# Patient Record
Sex: MALE | Race: WHITE | NOT HISPANIC OR LATINO | URBAN - METROPOLITAN AREA
[De-identification: names, ages, dates, MRNs, and addresses within clinical notes are randomized per-mention and may not be internally consistent; named-entity substitution may affect disease eponyms.]

---

## 2021-11-17 ENCOUNTER — INPATIENT (INPATIENT)
Facility: HOSPITAL | Age: 61
LOS: 2 days | Discharge: HOME | DRG: 41 | End: 2021-11-20
Attending: STUDENT IN AN ORGANIZED HEALTH CARE EDUCATION/TRAINING PROGRAM | Admitting: PSYCHIATRY & NEUROLOGY
Payer: COMMERCIAL

## 2021-11-17 VITALS
RESPIRATION RATE: 17 BRPM | OXYGEN SATURATION: 99 % | DIASTOLIC BLOOD PRESSURE: 69 MMHG | HEART RATE: 76 BPM | SYSTOLIC BLOOD PRESSURE: 104 MMHG | TEMPERATURE: 98 F

## 2021-11-17 DIAGNOSIS — G45.9 TRANSIENT CEREBRAL ISCHEMIC ATTACK, UNSPECIFIED: ICD-10-CM

## 2021-11-17 LAB
GLUCOSE BLDC GLUCOMTR-MCNC: 100 MG/DL — HIGH (ref 70–99)
GLUCOSE BLDC GLUCOMTR-MCNC: 67 MG/DL — LOW (ref 70–99)
GLUCOSE BLDC GLUCOMTR-MCNC: 83 MG/DL — SIGNIFICANT CHANGE UP (ref 70–99)
GLUCOSE BLDC GLUCOMTR-MCNC: 88 MG/DL — SIGNIFICANT CHANGE UP (ref 70–99)
HIV 1 & 2 AB SERPL IA.RAPID: SIGNIFICANT CHANGE UP
TSH SERPL-MCNC: 1.07 UIU/ML — SIGNIFICANT CHANGE UP (ref 0.27–4.2)

## 2021-11-17 PROCEDURE — 99291 CRITICAL CARE FIRST HOUR: CPT

## 2021-11-17 PROCEDURE — 99285 EMERGENCY DEPT VISIT HI MDM: CPT

## 2021-11-17 PROCEDURE — 93306 TTE W/DOPPLER COMPLETE: CPT | Mod: 26

## 2021-11-17 PROCEDURE — 99223 1ST HOSP IP/OBS HIGH 75: CPT

## 2021-11-17 RX ORDER — ASPIRIN/CALCIUM CARB/MAGNESIUM 324 MG
325 TABLET ORAL ONCE
Refills: 0 | Status: COMPLETED | OUTPATIENT
Start: 2021-11-17 | End: 2021-11-17

## 2021-11-17 RX ORDER — INFLUENZA VIRUS VACCINE 15; 15; 15; 15 UG/.5ML; UG/.5ML; UG/.5ML; UG/.5ML
0.5 SUSPENSION INTRAMUSCULAR ONCE
Refills: 0 | Status: DISCONTINUED | OUTPATIENT
Start: 2021-11-17 | End: 2021-11-20

## 2021-11-17 RX ORDER — SODIUM CHLORIDE 9 MG/ML
1000 INJECTION, SOLUTION INTRAVENOUS
Refills: 0 | Status: DISCONTINUED | OUTPATIENT
Start: 2021-11-17 | End: 2021-11-20

## 2021-11-17 RX ORDER — DEXTROSE 50 % IN WATER 50 %
12.5 SYRINGE (ML) INTRAVENOUS ONCE
Refills: 0 | Status: DISCONTINUED | OUTPATIENT
Start: 2021-11-17 | End: 2021-11-20

## 2021-11-17 RX ORDER — DEXTROSE 50 % IN WATER 50 %
25 SYRINGE (ML) INTRAVENOUS ONCE
Refills: 0 | Status: DISCONTINUED | OUTPATIENT
Start: 2021-11-17 | End: 2021-11-20

## 2021-11-17 RX ORDER — DEXTROSE 50 % IN WATER 50 %
15 SYRINGE (ML) INTRAVENOUS ONCE
Refills: 0 | Status: DISCONTINUED | OUTPATIENT
Start: 2021-11-17 | End: 2021-11-20

## 2021-11-17 RX ORDER — SODIUM CHLORIDE 9 MG/ML
1000 INJECTION INTRAMUSCULAR; INTRAVENOUS; SUBCUTANEOUS
Refills: 0 | Status: DISCONTINUED | OUTPATIENT
Start: 2021-11-17 | End: 2021-11-18

## 2021-11-17 RX ORDER — GLUCAGON INJECTION, SOLUTION 0.5 MG/.1ML
1 INJECTION, SOLUTION SUBCUTANEOUS ONCE
Refills: 0 | Status: COMPLETED | OUTPATIENT
Start: 2021-11-17 | End: 2022-10-16

## 2021-11-17 RX ORDER — DEXTROSE 50 % IN WATER 50 %
25 SYRINGE (ML) INTRAVENOUS ONCE
Refills: 0 | Status: COMPLETED | OUTPATIENT
Start: 2021-11-17 | End: 2021-11-17

## 2021-11-17 RX ADMIN — Medication 325 MILLIGRAM(S): at 12:38

## 2021-11-17 RX ADMIN — SODIUM CHLORIDE 75 MILLILITER(S): 9 INJECTION INTRAMUSCULAR; INTRAVENOUS; SUBCUTANEOUS at 15:54

## 2021-11-17 RX ADMIN — SODIUM CHLORIDE 1000 MILLILITER(S): 9 INJECTION INTRAMUSCULAR; INTRAVENOUS; SUBCUTANEOUS at 20:20

## 2021-11-17 RX ADMIN — SODIUM CHLORIDE 75 MILLILITER(S): 9 INJECTION INTRAMUSCULAR; INTRAVENOUS; SUBCUTANEOUS at 12:38

## 2021-11-17 RX ADMIN — Medication 25 GRAM(S): at 13:53

## 2021-11-17 NOTE — ED ADULT NURSE NOTE - OBJECTIVE STATEMENT
assumed pt care at 1220.  Pt reports waking up this morning with aphasic, visual changes, left side facial droop and right arm weakness.  Symptoms started to resolve when EMS arrived.  He was transferred from [facility] to [facility] today after MRI and bloodwork done at [place].  He is alert and oriented X 4 with no deficits and no pain.  Family at bedside.  Side rails up. assumed pt care at 1220.  Pt reports waking up this morning with aphasic, visual changes, left side facial droop and right arm weakness.  Symptoms started to resolve when EMS arrived.  All symptoms resolved after MRI.  He was transferred from [facility] to [facility] today after MRI and bloodwork done at [place].  He is alert and oriented X 4 with no deficits and no pain. Pt has baseline right sided weakness due to poly myositis.  Family at bedside.  Side rails up.

## 2021-11-17 NOTE — ED PROVIDER NOTE - OBJECTIVE STATEMENT
60 yo male pmh rheumatoid arthritis, Raynauds disease comes to ed transferred from [facility]  for evaluation of Stroke/TIa; pt last known normal  930 pm ; pt woke up at 5am with double vision , generalized weakness and difficulty speaking; While at [place] , pt obtained ct of head, CTA head and neck and MR of brain during which time pt's symptoms resolved; pt presently without complaints;   code stroke called in ed; Neurology at bedside;

## 2021-11-17 NOTE — ED ADULT NURSE REASSESSMENT NOTE - NS ED NURSE REASSESS COMMENT FT1
Pt given 8oz of apple juice for blood glucose 58 upon arrival to ED.  Neuro ICU team at bedside and is aware.

## 2021-11-17 NOTE — PROVIDER CONTACT NOTE (OTHER) - SITUATION
finger stick 58 upon arrival, given 8oz apple juice at 1240.  blurred vision left eye approx 1330, finger stick 72.  given 8oz orange juice, package of jh doons, package of berenice crackers.  BG 76

## 2021-11-17 NOTE — H&P ADULT - NSHPPHYSICALEXAM_GEN_ALL_CORE
GEN: NAD, well groomed  HEAD: Atraumatic, normocephalic  MENTAL STATUS: Awake, alert, oriented to self, hospital, full date. Appropriately conversant without aphasia. Following commands  CRANIAL NERVES: PERRL. EOMI. Facial sensation intact. Face symmetric, tongue midline. Hearing intact. Speech clear  MOTOR: b/l upper extremities 5/5 except b/l triceps 4-/5; b/l LE 4/5  SENSATION: Grossly intact to light touch throughout  CHEST: Nonlabored breathing, no respiratory distress  ABD: Soft, nontender, nondistended  EXTREMITIES: Nonedematous, nontender to palpation

## 2021-11-17 NOTE — H&P ADULT - ASSESSMENT
NOTE INCOMPLETE 61M PMH rheumatoid arthritis, polymyositis with baseline b/l upper extremity and RLE weakness, Raynaud's syndrome,  presented to [facility] after acute onset altered mental status 05:30 AM nonverbal, facial droop, right hemiparesis, ataxia. 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 Westfall ([facility])  - D/w Dr. Ibarra (neurology)    NEURO:  - Q1 neuro checks in [facility]  - Start vEEG to rule out seizure  - Stroke core measures: [place] A1C 5.4, TSH 1.07, LDL 85  - Activity: [x] mobilize as tolerated [] Bedrest [x] PT [x] OT [] PMNR    PULM:  - SpO2 goal > 92%    CV:  - SBP goal 100-160  - TTE     RENAL:  - Fluids: NS @ 75 until tolerating diet  - Monitor I/Os    GI:  - Diet: Advance as tolerated once passes dysphagia  - GI prophylaxis [x] not indicated [] PPI [] other:  - Bowel regimen [] colace [x] senna [] other:    ENDO:   - Goal euglycemia (-180)    HEME/ONC:  - VTE prophylaxis: [x] SCDs [] chemoprophylaxis [] hold chemoprophylaxis due to: [] high risk of DVT/PE on admission due to:    ID:  - Monitor for fever/leukoctyosis    SOCIAL/FAMILY:  [] awaiting [x] updated at bedside [] family meeting    CODE STATUS:  [x] Full Code [] DNR [] DNI [] Palliative/Comfort Care    DISPOSITION:  [x] ICU [] Stroke Unit [] Floor [] EMU [] RCU [] PCU    [x] Patient is at high risk of neurologic deterioration/death due to: stroke, hemorrhagic conversion, seizure    Time spent: 30 critical care minutes

## 2021-11-17 NOTE — H&P ADULT - ATTENDING COMMENTS
Agree with above assessment and plan. Pt seen and examined.    61M PMH rheumatoid arthritis, polymyositis with baseline b/l upper extremity and RLE weakness, Raynaud's syndrome, only on holistic medication, presented to [facility] after acute onset altered mental status earlier this AM. Pt was seen via telestroke. Upon arrival to [facility] ED all symptoms had resolved.    Neurointact  NIHSS 0    admit for stroke/TIA eval  EEG for possible seizure  stroke core measures

## 2021-11-17 NOTE — CONSULT NOTE ADULT - SUBJECTIVE AND OBJECTIVE BOX
NIH SS:   Date:   Time:   1a) Level of consciousness (0-3):   1b) Questions (0-2):   1c) Commands (0-2):   2  ) Gaze (0-2):   3  ) Visual field (0-3):   4  ) Facial palsy (0-3):   Motor  5a) Left arm (0-4):   5b) Right arm (0-4):   6a) Left leg (0-4):   6b) Right leg (0-4):   7  ) Ataxia (0-2):   Sensory  8  ) Sensory (0-2):   Speech  9  ) Language (0-3):   10) Dysarthria (0-2):   Extinction  11) Extinction/inattention (0-2):     Total score:     Prestroke Modified [place]:     (0: No symptoms and no disability.  1: No significant disability despite symptoms; able to carry out all usual duties and activities.  2: Slight disability; unable to carry out all previous activity but able to look after own affairs without assistance.  3: Moderate disability; requiring some help but able to walk without assistance.   4: Moderately severe disability; unable to walk without assistance and unable to attend to own bodily needs without assistance.  5: Severe disability; bedridden, incontinent and requiring constant nursing care and attention.   6: Dead. )   NIH SS:   Date:   Time:   1a) Level of consciousness (0-3):   1b) Questions (0-2):   1c) Commands (0-2):   2  ) Gaze (0-2):   3  ) Visual field (0-3):   4  ) Facial palsy (0-3):   Motor  5a) Left arm (0-4):   5b) Right arm (0-4):   6a) Left leg (0-4):   6b) Right leg (0-4):   7  ) Ataxia (0-2):   Sensory  8  ) Sensory (0-2):   Speech  9  ) Language (0-3):   10) Dysarthria (0-2):   Extinction  11) Extinction/inattention (0-2):     Total score:     Prestroke Modified [place]:              Neurology consult    LESLI SCHROEDER 61y Male     Patient is a 61y old  Male who presents with a chief complaint of Acute onset hemiparesis and difficulty speaking (17 Nov 2021 12:53)      HPI:  61M PMH rheumatoid arthritis, polymyositis with baseline b/l upper extremity and RLE weakness, Raynaud's syndrome, only on holistic medication, presented to [facility] after acute onset altered mental status earlier this AM, last known well 21:30 last night when he went to bed. Per daughter and patient, he awoke ~ 05:30 AM and was able to walk down the stairs, but while he was sitting on the couch he was noted to be nonverbal, have a left facial droop, and have difficulty moving his right side. On arrival to [facility], NIH 20, CT/CTA/CTP without acute findings. MRI/MRA brain also obtained without acute findings. 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 No nausea, vomiting  No diarrhea or constipation.   Genitourinary: No dysuria, frequency, hematuria or incontinence  Neurological: No headaches, lightheadedness, vertigo, numbness or tremors  Psychiatric: No depression, anxiety, mood swings or difficulty sleeping  Musculoskeletal: No joint pain or swelling; No muscle, back or extremity pain  Skin: No itching, burning, rashes or lesions   Lymph Nodes: No enlarged glands  Endocrine: No heat or cold intolerance; No hair loss, No h/o diabetes or thyroid dysfunction  Allergy and Immunologic: No hives or eczema      PMH: Rheumatoid arthritis    Raynaud disease    History of myositis         PSH: No significant past surgical history      FAMILY HISTORY:      SOCIAL HISTORY:  No history of tobacco or alcohol use     Allergies    No Known Allergies    Intolerances            Vital Signs Last 24 Hrs  T(C): 37 (17 Nov 2021 19:31), Max: 37 (17 Nov 2021 16:00)  T(F): 98.6 (17 Nov 2021 19:31), Max: 98.6 (17 Nov 2021 16:00)  HR: 66 (17 Nov 2021 21:00) (66 - 78)  BP: 103/67 (17 Nov 2021 21:00) (90/59 - 109/59)  BP(mean): 78 (17 Nov 2021 21:00) (67 - 82)  RR: 18 (17 Nov 2021 21:00) (11 - 23)  SpO2: 98% (17 Nov 2021 21:00) (97% - 100%)  MEDICATIONS    dextrose 40% Gel 15 Gram(s) Oral once  dextrose 5%. 1000 milliLiter(s) IV Continuous <Continuous>  dextrose 5%. 1000 milliLiter(s) IV Continuous <Continuous>  dextrose 50% Injectable 25 Gram(s) IV Push once  dextrose 50% Injectable 12.5 Gram(s) IV Push once  dextrose 50% Injectable 25 Gram(s) IV Push once  glucagon  Injectable 1 milliGRAM(s) IntraMuscular once  influenza   Vaccine 0.5 milliLiter(s) IntraMuscular once  sodium chloride 0.9%. 1000 milliLiter(s) IV Continuous <Continuous>        LABS:          On Neurological Examination:    Mental Status - Patient is alert, awake, oriented X3. fluent, names and repeats correctly  There is no dysarthria, no aphasia, follows commands well and able to answer questions appropriately. Mood and affect  normal    Cranial Nerves - PERRL, EOMI,  Visual fields are full to finger counting, no gross facial asymmetry, tongue/uvula midline    Motor Exam -   Both UE 4/5 proximally and 5/5 distally  Both LE -5/5 No drift     nml bulk/tone    Sensory    Intact to light touch and pinprick bilaterally    Coord: FTN intact bilaterally     Gait -  Not assessed.                      [facility] SS:   Date: 11-  Time: 1130  1a) Level of consciousness (0-3):   1b) Questions (0-2):   1c) Commands (0-2):   2  ) Gaze (0-2):   3  ) Visual field (0-3):   4  ) Facial palsy (0-3):   Motor  5a) Left arm (0-4):   5b) Right arm (0-4):   6a) Left leg (0-4):   6b) Right leg (0-4):   7  ) Ataxia (0-2):   Sensory  8  ) Sensory (0-2):   Speech  9  ) Language (0-3):   10) Dysarthria (0-2):   Extinction  11) Extinction/inattention (0-2):     Total score: = 0    Prestroke Modified [place]:         GENERAL Exam:     Nontoxic , No Acute Distress   	  HEENT:  normocephalic, atraumatic  		  LUNGS:	Clear bilaterally  No Wheeze  Decreased bilaterally  	  HEART:	 Normal S1S2   No murmur RRR        	  GI/ ABDOMEN:  Soft  Non tender    EXTREMITIES:   No Edema  No Clubbing  No Cyanosis No Edema    MUSCULOSKELETAL: Normal Range of Motion  	   SKIN:      Normal   No Ecchymosis               RADIOLOGY  CTH     No acute intracranial bleeding, mass effect, or shift.    CT PERFUSION: No regional perfusion abnormality.  l  CTA BRAIN: Patent intracranial circulation. No flow-limiting stenosis or  occlusion.    CTA NECK: Patent cervical vasculature. No flow limiting stenosis or  occlusion.    Incidentally noted is soft tissue asymmetry along the left tonsillar region  as described above, correlate clinically with visual inspection.      MRI:    MRI BRAIN:  No hydrocephalus, mass effect, acute intracranial hemorrhage, vasogenic  edema, or acute territorial infarct.    Signal abnormality in the mesial left thalamus on diffusion-weighted images  is felt to represent artifact.    MRA BRAIN:  No vascular aneurysm or flow-limiting stenosis.    MRA NECK:  flow-limiting stenosis or vascular aneurysm.  No evidence for arterial dissection.        TTE    Summary:   1. Left ventricular ejection fraction, by visual estimation, is 70 to 75%.   2. Normal global left ventricular systolic function.   3. Diastolic function is normal.   4. Normal right ventricular size and function, estimated PASP 33 mmHg.   5. Trace mitral valve regurgitation.   6. Mild tricuspid regurgitation.   7. There is no evidence of pericardial effusion.

## 2021-11-17 NOTE — PROVIDER CONTACT NOTE (CHANGE IN STATUS NOTIFICATION) - ACTION/TREATMENT ORDERED:
As per PA, will change BP parameter goals
as per PA, give 1 L NS over 1 hour
As per PA, let bolus finish and then measure BP

## 2021-11-17 NOTE — H&P ADULT - NSHPLABSRESULTS_GEN_ALL_CORE
[facility]    WBC 3.38  H/H 12.8/39.6      Na 141  K 4.4  Cl 107  CO2 26  BUN 13  Cr 0.80  Glucose 88    A1C 5.4  TSH 1.07  LDL 85

## 2021-11-17 NOTE — ED ADULT NURSE NOTE - CHIEF COMPLAINT QUOTE
transfer from [place] for expressive aphasia, weakness and double vision this morning, all resolved at this time.  stroke activated upon arrival.

## 2021-11-17 NOTE — ED ADULT TRIAGE NOTE - CHIEF COMPLAINT QUOTE
transfer from [place] for expressive aphasia, weakness and double vision this morning, all resolved at this time.  stroke activated upon arrival.

## 2021-11-17 NOTE — PROVIDER CONTACT NOTE (CHANGE IN STATUS NOTIFICATION) - SITUATION
patient /67, bolus still running
Discussed with PA BP goals for patient. For now, goal is -220 although patients /70
Patient's /65- his baseline is 100s-110s.

## 2021-11-17 NOTE — ED PROVIDER NOTE - PHYSICAL EXAMINATION
Alert, lucid, and in no apparent distress. Pt is normocephalic, atraumatic.  Pupils are equal, round, lips pink, moist mucous membranes, tongue midline. Neck supple.   Lungs clear to auscultation. Heart regular rate and rhythm, normal S1, S2, no murmurs, gallops, rubs.  Abdomen is soft, nontender, no pulsatile mass, no masses,    Non-focal sensory, 5 out of 5 motor strength, no dysmetria, fluent, goal directed speech. CN2 to 12 intact. Skin without rash, purpura, eccyhmosis  No submandibular adenopathy. Normal mentation, does not appear agitated

## 2021-11-17 NOTE — H&P ADULT - HISTORY OF PRESENT ILLNESS
61M PMH rheumatoid arthritis, polymyositis with baseline b/l upper extremity and RLE weakness, Raynaud's syndrome, only on holistic medication, presented to [facility] after acute onset altered mental status earlier this AM, last known well 21:30 last night when he went to bed. Per daughter and patient, he awoke ~ 05:30 AM and was able to walk down the stairs, but while he was sitting on the couch he was noted to be nonverbal, have a left facial droop, and have difficulty moving his right side. On arrival to [facility], NIH 20, CT/CTA/CTP without     NOTE INCOMPLETE 61M PMH rheumatoid arthritis, polymyositis with baseline b/l upper extremity and RLE weakness, Raynaud's syndrome, only on holistic medication, presented to [facility] after acute onset altered mental status earlier this AM, last known well 21:30 last night when he went to bed. Per daughter and patient, he awoke ~ 05:30 AM and was able to walk down the stairs, but while he was sitting on the couch he was noted to be nonverbal, have a left facial droop, and have difficulty moving his right side. On arrival to [facility], NIH 20, CT/CTA/CTP without acute findings. MRI/MRA brain also obtained without acute findings. 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

## 2021-11-18 ENCOUNTER — TRANSCRIPTION ENCOUNTER (OUTPATIENT)
Age: 61
End: 2021-11-18

## 2021-11-18 DIAGNOSIS — M06.9 RHEUMATOID ARTHRITIS, UNSPECIFIED: ICD-10-CM

## 2021-11-18 DIAGNOSIS — Z87.39 PERSONAL HISTORY OF OTHER DISEASES OF THE MUSCULOSKELETAL SYSTEM AND CONNECTIVE TISSUE: ICD-10-CM

## 2021-11-18 DIAGNOSIS — I73.00 RAYNAUD'S SYNDROME WITHOUT GANGRENE: ICD-10-CM

## 2021-11-18 DIAGNOSIS — G45.9 TRANSIENT CEREBRAL ISCHEMIC ATTACK, UNSPECIFIED: ICD-10-CM

## 2021-11-18 DIAGNOSIS — Z02.9 ENCOUNTER FOR ADMINISTRATIVE EXAMINATIONS, UNSPECIFIED: ICD-10-CM

## 2021-11-18 LAB
A1C WITH ESTIMATED AVERAGE GLUCOSE RESULT: 5 % — SIGNIFICANT CHANGE UP (ref 4–5.6)
ANION GAP SERPL CALC-SCNC: 10 MMOL/L — SIGNIFICANT CHANGE UP (ref 5–17)
BUN SERPL-MCNC: 8.7 MG/DL — SIGNIFICANT CHANGE UP (ref 8–20)
CALCIUM SERPL-MCNC: 8.2 MG/DL — LOW (ref 8.6–10.2)
CHLORIDE SERPL-SCNC: 111 MMOL/L — HIGH (ref 98–107)
CHOLEST SERPL-MCNC: 122 MG/DL — SIGNIFICANT CHANGE UP
CO2 SERPL-SCNC: 21 MMOL/L — LOW (ref 22–29)
COVID-19 NUCLEOCAPSID GAM AB INTERP: NEGATIVE — SIGNIFICANT CHANGE UP
COVID-19 NUCLEOCAPSID TOTAL GAM ANTIBODY RESULT: 0.17 INDEX — SIGNIFICANT CHANGE UP
COVID-19 SPIKE DOMAIN AB INTERP: NEGATIVE — SIGNIFICANT CHANGE UP
CREAT SERPL-MCNC: 0.53 MG/DL — SIGNIFICANT CHANGE UP (ref 0.5–1.3)
ESTIMATED AVERAGE GLUCOSE: 97 MG/DL — SIGNIFICANT CHANGE UP (ref 68–114)
GLUCOSE BLDC GLUCOMTR-MCNC: 118 MG/DL — HIGH (ref 70–99)
GLUCOSE BLDC GLUCOMTR-MCNC: 86 MG/DL — SIGNIFICANT CHANGE UP (ref 70–99)
GLUCOSE BLDC GLUCOMTR-MCNC: 88 MG/DL — SIGNIFICANT CHANGE UP (ref 70–99)
GLUCOSE BLDC GLUCOMTR-MCNC: 89 MG/DL — SIGNIFICANT CHANGE UP (ref 70–99)
GLUCOSE SERPL-MCNC: 88 MG/DL — SIGNIFICANT CHANGE UP (ref 70–99)
HCT VFR BLD CALC: 36 % — LOW (ref 39–50)
HCV AB S/CO SERPL IA: 0.13 S/CO — SIGNIFICANT CHANGE UP (ref 0–0.99)
HCV AB SERPL-IMP: SIGNIFICANT CHANGE UP
HDLC SERPL-MCNC: 39 MG/DL — LOW
HGB BLD-MCNC: 11.5 G/DL — LOW (ref 13–17)
LIPID PNL WITH DIRECT LDL SERPL: 70 MG/DL — SIGNIFICANT CHANGE UP
MAGNESIUM SERPL-MCNC: 1.9 MG/DL — SIGNIFICANT CHANGE UP (ref 1.6–2.6)
MCHC RBC-ENTMCNC: 29.6 PG — SIGNIFICANT CHANGE UP (ref 27–34)
MCHC RBC-ENTMCNC: 31.9 GM/DL — LOW (ref 32–36)
MCV RBC AUTO: 92.5 FL — SIGNIFICANT CHANGE UP (ref 80–100)
NON HDL CHOLESTEROL: 83 MG/DL — SIGNIFICANT CHANGE UP
PHOSPHATE SERPL-MCNC: 2.8 MG/DL — SIGNIFICANT CHANGE UP (ref 2.4–4.7)
PLATELET # BLD AUTO: 170 K/UL — SIGNIFICANT CHANGE UP (ref 150–400)
POTASSIUM SERPL-MCNC: 4.2 MMOL/L — SIGNIFICANT CHANGE UP (ref 3.5–5.3)
POTASSIUM SERPL-SCNC: 4.2 MMOL/L — SIGNIFICANT CHANGE UP (ref 3.5–5.3)
RBC # BLD: 3.89 M/UL — LOW (ref 4.2–5.8)
RBC # FLD: 14.7 % — HIGH (ref 10.3–14.5)
SARS-COV-2 IGG+IGM SERPL QL IA: 0.17 INDEX — SIGNIFICANT CHANGE UP
SARS-COV-2 IGG+IGM SERPL QL IA: NEGATIVE — SIGNIFICANT CHANGE UP
SARS-COV-2 IGG+IGM SERPL QL IA: NEGATIVE — SIGNIFICANT CHANGE UP
SODIUM SERPL-SCNC: 142 MMOL/L — SIGNIFICANT CHANGE UP (ref 135–145)
TRIGL SERPL-MCNC: 65 MG/DL — SIGNIFICANT CHANGE UP
WBC # BLD: 3.26 K/UL — LOW (ref 3.8–10.5)
WBC # FLD AUTO: 3.26 K/UL — LOW (ref 3.8–10.5)

## 2021-11-18 PROCEDURE — 99233 SBSQ HOSP IP/OBS HIGH 50: CPT

## 2021-11-18 PROCEDURE — 99232 SBSQ HOSP IP/OBS MODERATE 35: CPT

## 2021-11-18 PROCEDURE — 95718 EEG PHYS/QHP 2-12 HR W/VEEG: CPT

## 2021-11-18 RX ORDER — MAGNESIUM SULFATE 500 MG/ML
1 VIAL (ML) INJECTION ONCE
Refills: 0 | Status: COMPLETED | OUTPATIENT
Start: 2021-11-18 | End: 2021-11-18

## 2021-11-18 RX ORDER — ASPIRIN/CALCIUM CARB/MAGNESIUM 324 MG
81 TABLET ORAL DAILY
Refills: 0 | Status: DISCONTINUED | OUTPATIENT
Start: 2021-11-18 | End: 2021-11-20

## 2021-11-18 RX ORDER — SODIUM,POTASSIUM PHOSPHATES 278-250MG
1 POWDER IN PACKET (EA) ORAL ONCE
Refills: 0 | Status: COMPLETED | OUTPATIENT
Start: 2021-11-18 | End: 2021-11-18

## 2021-11-18 RX ORDER — SODIUM CHLORIDE 9 MG/ML
1000 INJECTION INTRAMUSCULAR; INTRAVENOUS; SUBCUTANEOUS ONCE
Refills: 0 | Status: COMPLETED | OUTPATIENT
Start: 2021-11-18 | End: 2021-11-17

## 2021-11-18 RX ORDER — ENOXAPARIN SODIUM 100 MG/ML
40 INJECTION SUBCUTANEOUS
Refills: 0 | Status: DISCONTINUED | OUTPATIENT
Start: 2021-11-18 | End: 2021-11-20

## 2021-11-18 RX ORDER — INSULIN LISPRO 100/ML
VIAL (ML) SUBCUTANEOUS
Refills: 0 | Status: DISCONTINUED | OUTPATIENT
Start: 2021-11-18 | End: 2021-11-20

## 2021-11-18 RX ADMIN — Medication 1 PACKET(S): at 09:32

## 2021-11-18 RX ADMIN — Medication 81 MILLIGRAM(S): at 12:02

## 2021-11-18 RX ADMIN — Medication 100 GRAM(S): at 06:24

## 2021-11-18 RX ADMIN — ENOXAPARIN SODIUM 40 MILLIGRAM(S): 100 INJECTION SUBCUTANEOUS at 18:59

## 2021-11-18 NOTE — PROGRESS NOTE ADULT - ASSESSMENT
Assessment:   61 M with PMH RA, polymyositis, who presented to [place] with AMS, diplopia, and generalized weakness. Concern for basilar stroke, stat MRI performed in which symptoms resolved. Patient transferred to [facility] for workup. Patient remains at baseline neurologic status.       Plan:   - Q1 hour neuro checks  - SBP goal 100-220   - Aspirin 81 per stroke protocol   - Stroke core measures: LDL/A1C pending, TTE performed as above, MRI performed as above   - PT / OT / ST evaluations  - DVT prophylaxis: SCDs, lovenox  - Possible downgrade from ICU to medicine team today   - Further care per NSICU team  - Plan pending morning rounds with attending      Assessment:   61 M with PMH RA, polymyositis, who presented to [place] with AMS, diplopia, and generalized weakness. Concern for basilar stroke, stat MRI performed in which symptoms resolved. Patient transferred to [facility] for workup. Patient remains at baseline neurologic status.       Plan:   - Q1 hour neuro checks  - SBP goal 100-220   - Aspirin 81 per stroke protocol   - Stroke core measures: LDL/A1C pending, TTE performed as above, MRI performed as above   - PT / OT / ST evaluations  - DVT prophylaxis: SCDs, lovenox  - Possible downgrade from ICU to medicine team today   - Further care per NSICU team  - Plan pending morning rounds with attending     ADDENDUM 11/18/21 10:31  - Patient seen and case d/w Dr. Ibarra  - OK to downgrade to telemetry, Q4 neuro checks  - Repeat MR brain to rule out stroke vs artifact seen on [place] MRI  - Continue ASA 81, hold on statin for now with history of polymyositis  - Check CPK in AM, ordered  - Consult cardiology to determine if candidate/need for KENJI or ILR  - Continue PT/OT  - On lovenox for DVT ppx  - Supportive care/further medical management per primary team

## 2021-11-18 NOTE — PROGRESS NOTE ADULT - SUBJECTIVE AND OBJECTIVE BOX
Patient is a 61y old  Male who presents with a chief complaint of Acute onset hemiparesis and difficulty speaking (17 Nov 2021 12:53)    HPI:  61M PMH rheumatoid arthritis, polymyositis with baseline b/l upper extremity and RLE weakness, Raynaud's syndrome, only on holistic medication, presented to [facility] after acute onset altered mental status earlier this AM, last known well 21:30 last night when he went to bed. Per daughter and patient, he awoke ~ 05:30 AM and was able to walk down the stairs, but while he was sitting on the couch he was noted to be nonverbal, have a left facial droop, and have difficulty moving his right side. On arrival to [facility], NIH 20, CT/CTA/CTP without acute findings. MRI/MRA brain also obtained without acute findings. 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 Patient transferred yesterday from [place] for concern for basilar occlusion/stroke with resolution of symptoms on arrival. Patient admitted to NSICU for monitoring 2/2 possible basilar symptoms. Patient has remained intact, no acute events reported by staff.       Vital Signs Last 24 Hrs  T(C): 36.6 (17 Nov 2021 23:41), Max: 37 (17 Nov 2021 16:00)  T(F): 97.9 (17 Nov 2021 23:41), Max: 98.6 (17 Nov 2021 16:00)  HR: 71 (18 Nov 2021 00:00) (66 - 78)  BP: 111/69 (18 Nov 2021 00:00) (90/59 - 111/69)  BP(mean): 82 (18 Nov 2021 00:00) (67 - 82)  RR: 14 (18 Nov 2021 00:00) (11 - 23)  SpO2: 97% (18 Nov 2021 00:00) (97% - 100%)      Physical Exam:  Constitutional: NAD, lying in bed   Neuro  	* Mental Status:  GCS 15: E4, V5, M6. Awake, alert, oriented to conversation. No aphasia & word finding difficulty. Able to name objects & their function.   	* Cranial Nerves: Cnii-Cnxii grossly intact. PERRL, EOMI, tongue midline, no gaze deviation.   	* Motor: b/u UE 5/5 except triceps 4-/5 (baseline), b/l LE 4/5  	* Sensory: Sensation intact to light touch  	* Reflexes: Not assessed  	* Gait: Not assessed      LABS:  AM labs pending      Medications:  MEDICATIONS  (STANDING):  dextrose 40% Gel 15 Gram(s) Oral once  dextrose 5%. 1000 milliLiter(s) (50 mL/Hr) IV Continuous <Continuous>  dextrose 5%. 1000 milliLiter(s) (100 mL/Hr) IV Continuous <Continuous>  dextrose 50% Injectable 25 Gram(s) IV Push once  dextrose 50% Injectable 12.5 Gram(s) IV Push once  dextrose 50% Injectable 25 Gram(s) IV Push once  glucagon  Injectable 1 milliGRAM(s) IntraMuscular once  influenza   Vaccine 0.5 milliLiter(s) IntraMuscular once  sodium chloride 0.9%. 1000 milliLiter(s) (75 mL/Hr) IV Continuous <Continuous>    MEDICATIONS  (PRN):      RADIOLOGY & ADDITIONAL STUDIES:  CTH: No acute intracranial bleeding, mass effect, or shift.    CT PERFUSION: No regional perfusion abnormality.    CTA BRAIN: Patent intracranial circulation. No flow-limiting stenosis or occlusion.    CTA NECK: Patent cervical vasculature. No flow limiting stenosis or occlusion.    Incidentally noted is soft tissue asymmetry along the left tonsillar region  as described above, correlate clinically with visual inspection.      MRI:    MRI BRAIN:  No hydrocephalus, mass effect, acute intracranial hemorrhage, vasogenic edema, or acute territorial infarct.    Signal abnormality in the mesial left thalamus on diffusion-weighted images is felt to represent artifact.    MRA BRAIN:  No vascular aneurysm or flow-limiting stenosis.    MRA NECK:  flow-limiting stenosis or vascular aneurysm.  No evidence for arterial dissection.    TTE 11/17/21    Summary:   1. Left ventricular ejection fraction, by visual estimation, is 70 to 75%.   2. Normal global left ventricular systolic function.   3. Diastolic function is normal.   4. Normal right ventricular size and function, estimated PASP 33 mmHg.   5. Trace mitral valve regurgitation.   6. Mild tricuspid regurgitation.   7. There is no evidence of pericardial effusion.    Alverto Bianchi DO Electronically signed on 11/17/2021 at 6:29:49 PM   Patient is a 61y old  Male who presents with a chief complaint of Acute onset hemiparesis and difficulty speaking (17 Nov 2021 12:53)    HPI:  61M PMH rheumatoid arthritis, polymyositis with baseline b/l upper extremity and RLE weakness, Raynaud's syndrome, only on holistic medication, presented to [facility] after acute onset altered mental status earlier this AM, last known well 21:30 last night when he went to bed. Per daughter and patient, he awoke ~ 05:30 AM and was able to walk down the stairs, but while he was sitting on the couch he was noted to be nonverbal, have a left facial droop, and have difficulty moving his right side. On arrival to [facility], NIH 20, CT/CTA/CTP without acute findings. MRI/MRA brain also obtained without acute findings. 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 Patient transferred yesterday from [place] for concern for basilar occlusion/stroke with resolution of symptoms on arrival. Patient admitted to NSICU for monitoring 2/2 possible basilar symptoms. Patient has remained intact, no acute events reported by staff.       Vital Signs Last 24 Hrs    T(C): 36.7 (18 Nov 2021 11:30), Max: 37 (17 Nov 2021 16:00)  T(F): 98.1 (18 Nov 2021 11:30), Max: 98.6 (17 Nov 2021 16:00)  HR: 75 (18 Nov 2021 12:00) (57 - 80)  BP: 113/66 (18 Nov 2021 12:00) (90/59 - 129/95)  BP(mean): 80 (18 Nov 2021 12:00) (67 - 107)  RR: 19 (18 Nov 2021 12:00) (11 - 23)  SpO2: 99% (18 Nov 2021 09:00) (97% - 100%)      Physical Exam:  Constitutional: NAD, lying in bed   Neuro  	* Mental Status:  GCS 15: E4, V5, M6. Awake, alert, oriented to conversation. No aphasia & word finding difficulty. Able to name objects & their function.   	* Cranial Nerves: Cnii-Cnxii grossly intact. PERRL, EOMI, tongue midline, no gaze deviation.   	* Motor: b/u UE 5/5 except triceps 4-/5 (baseline), b/l LE 4/5  	* Sensory: Sensation intact to light touch  	* Reflexes: Not assessed  	* Gait: Not assessed      LABS:    CBC Full  -  ( 18 Nov 2021 04:07 )  WBC Count : 3.26 K/uL  RBC Count : 3.89 M/uL  Hemoglobin : 11.5 g/dL  Hematocrit : 36.0 %  Platelet Count - Automated : 170 K/uL  Mean Cell Volume : 92.5 fl  Mean Cell Hemoglobin : 29.6 pg  Mean Cell Hemoglobin Concentration : 31.9 gm/dL  Auto Neutrophil # : x  Auto Lymphocyte # : x  Auto Monocyte # : x  Auto Eosinophil # : x  Auto Basophil # : x  Auto Neutrophil % : x  Auto Lymphocyte % : x  Auto Monocyte % : x  Auto Eosinophil % : x  Auto Basophil % : x      11-18    142  |  111<H>  |  8.7  ----------------------------<  88  4.2   |  21.0<L>  |  0.53    Ca    8.2<L>      18 Nov 2021 04:07  Phos  2.8     11-18  Mg     1.9     11-18        Hemoglobin A1C:   Lipid Panel 11-18 @ 04:07  Total Cholesterol, Serum 122  LDL --  Triglycerides 65    Medications:  MEDICATIONS  (STANDING):  aspirin  chewable 81 milliGRAM(s) Oral daily  dextrose 40% Gel 15 Gram(s) Oral once  dextrose 5%. 1000 milliLiter(s) (50 mL/Hr) IV Continuous <Continuous>  dextrose 5%. 1000 milliLiter(s) (100 mL/Hr) IV Continuous <Continuous>  dextrose 50% Injectable 25 Gram(s) IV Push once  dextrose 50% Injectable 12.5 Gram(s) IV Push once  dextrose 50% Injectable 25 Gram(s) IV Push once  enoxaparin Injectable 40 milliGRAM(s) SubCutaneous <User Schedule>  glucagon  Injectable 1 milliGRAM(s) IntraMuscular once  influenza   Vaccine 0.5 milliLiter(s) IntraMuscular once  insulin lispro (ADMELOG) corrective regimen sliding scale   SubCutaneous three times a day before meals    MEDICATIONS  (PRN):      RADIOLOGY & ADDITIONAL STUDIES:  CTH: No acute intracranial bleeding, mass effect, or shift.    CT PERFUSION: No regional perfusion abnormality.    CTA BRAIN: Patent intracranial circulation. No flow-limiting stenosis or occlusion.    CTA NECK: Patent cervical vasculature. No flow limiting stenosis or occlusion.    Incidentally noted is soft tissue asymmetry along the left tonsillar region  as described above, correlate clinically with visual inspection.      MRI:    MRI BRAIN:  No hydrocephalus, mass effect, acute intracranial hemorrhage, vasogenic edema, or acute territorial infarct.    Signal abnormality in the mesial left thalamus on diffusion-weighted images is felt to represent artifact.    MRA BRAIN:  No vascular aneurysm or flow-limiting stenosis.    MRA NECK:  flow-limiting stenosis or vascular aneurysm.  No evidence for arterial dissection.    TTE 11/17/21    Summary:   1. Left ventricular ejection fraction, by visual estimation, is 70 to 75%.   2. Normal global left ventricular systolic function.   3. Diastolic function is normal.   4. Normal right ventricular size and function, estimated PASP 33 mmHg.   5. Trace mitral valve regurgitation.   6. Mild tricuspid regurgitation.   7. There is no evidence of pericardial effusion.    Alverto Bianchi DO Electronically signed on 11/17/2021 at 6:29:49 PM

## 2021-11-18 NOTE — PROGRESS NOTE ADULT - SUBJECTIVE AND OBJECTIVE BOX
Chief complaint:   Patient is a 61y old  Male who presents with a chief complaint of Acute onset hemiparesis and difficulty speaking (18 Nov 2021 01:36)    HPI:  61M PMH rheumatoid arthritis, polymyositis with baseline b/l upper extremity and RLE weakness, Raynaud's syndrome, only on holistic medication, presented to [facility] after acute onset altered mental status earlier this AM, last known well 21:30 last night when he went to bed. Per daughter and patient, he awoke ~ 05:30 AM and was able to walk down the stairs, but while he was sitting on the couch he was noted to be nonverbal, have a left facial droop, and have difficulty moving his right side. On arrival to [facility], NIH 20, CT/CTA/CTP without acute findings. MRI/MRA brain also obtained without acute findings. 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 prompted transfer to [facility] for evaluation and monitoring    Admission NIH 0  Admission mRS 0 (17 Nov 2021 12:53)        24hr EVENTS:      ROS: [ ]  Unable to assess due to mental status   All other systems negative    -----------------------------------------------------------------------------------------------------------------------------------------------------------------------------------  ICU Vital Signs Last 24 Hrs  T(C): 36.7 (18 Nov 2021 07:28), Max: 37 (17 Nov 2021 16:00)  T(F): 98.1 (18 Nov 2021 07:28), Max: 98.6 (17 Nov 2021 16:00)  HR: 68 (18 Nov 2021 08:00) (57 - 78)  BP: 111/73 (18 Nov 2021 08:00) (90/59 - 111/73)  BP(mean): 85 (18 Nov 2021 08:00) (67 - 85)  ABP: --  ABP(mean): --  RR: 17 (18 Nov 2021 08:00) (11 - 23)  SpO2: 97% (18 Nov 2021 08:00) (97% - 100%)      I&O's Summary    17 Nov 2021 07:01  -  18 Nov 2021 07:00  --------------------------------------------------------  IN: 1700 mL / OUT: 2025 mL / NET: -325 mL    18 Nov 2021 07:01  -  18 Nov 2021 08:22  --------------------------------------------------------  IN: 0 mL / OUT: 400 mL / NET: -400 mL        MEDICATIONS  (STANDING):  aspirin  chewable 81 milliGRAM(s) Oral daily  dextrose 40% Gel 15 Gram(s) Oral once  dextrose 5%. 1000 milliLiter(s) (50 mL/Hr) IV Continuous <Continuous>  dextrose 5%. 1000 milliLiter(s) (100 mL/Hr) IV Continuous <Continuous>  dextrose 50% Injectable 25 Gram(s) IV Push once  dextrose 50% Injectable 12.5 Gram(s) IV Push once  dextrose 50% Injectable 25 Gram(s) IV Push once  enoxaparin Injectable 40 milliGRAM(s) SubCutaneous <User Schedule>  glucagon  Injectable 1 milliGRAM(s) IntraMuscular once  influenza   Vaccine 0.5 milliLiter(s) IntraMuscular once  insulin lispro (ADMELOG) corrective regimen sliding scale   SubCutaneous three times a day before meals  potassium phosphate / sodium phosphate Powder (PHOS-NaK) 1 Packet(s) Oral once      RESPIRATORY:        IMAGING:   Recent imaging studies were reviewed.    LAB RESULTS:                          11.5   3.26  )-----------( 170      ( 18 Nov 2021 04:07 )             36.0           11-18    142  |  111<H>  |  8.7  ----------------------------<  88  4.2   |  21.0<L>  |  0.53    Ca    8.2<L>      18 Nov 2021 04:07  Phos  2.8     11-18  Mg     1.9     11-18      -----------------------------------------------------------------------------------------------------------------------------------------------------------------------------------    PHYSICAL EXAM:  General: Calm, intubated  HEENT: MMM  Neuro:  -Mental status- No acute distress  -CN- PERRL 3mm, EOMI, tongue midline, face symmetric    CV: RRR  Pulm: clear to auscultation  Abd: Soft, nontender, nondistended  Ext: no noted edema in lower ext  Skin: warm, dry       Chief complaint:   Patient is a 61y old  Male who presents with a chief complaint of Acute onset hemiparesis and difficulty speaking (18 Nov 2021 01:36)    HPI:  61M PMH rheumatoid arthritis, polymyositis with baseline b/l upper extremity and RLE weakness, Raynaud's syndrome, only on holistic medication, presented to [facility] after acute onset altered mental status earlier this AM, last known well 21:30 last night when he went to bed. Per daughter and patient, he awoke ~ 05:30 AM and was able to walk down the stairs, but while he was sitting on the couch he was noted to be nonverbal, have a left facial droop, and have difficulty moving his right side. On arrival to [facility], NIH 20, CT/CTA/CTP without acute findings. MRI/MRA brain also obtained without acute findings. 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 prompted transfer to [facility] for evaluation and monitoring    Admission NIH 0  Admission mRS 0 (17 Nov 2021 12:53)    24hr EVENTS:  no issues   no seizures    ROS: no complaints    All other systems negative    -----------------------------------------------------------------------------------------------------------------------------------------------------------------------------------  ICU Vital Signs Last 24 Hrs  T(C): 36.7 (18 Nov 2021 07:28), Max: 37 (17 Nov 2021 16:00)  T(F): 98.1 (18 Nov 2021 07:28), Max: 98.6 (17 Nov 2021 16:00)  HR: 68 (18 Nov 2021 08:00) (57 - 78)  BP: 111/73 (18 Nov 2021 08:00) (90/59 - 111/73)  BP(mean): 85 (18 Nov 2021 08:00) (67 - 85)  ABP: --  ABP(mean): --  RR: 17 (18 Nov 2021 08:00) (11 - 23)  SpO2: 97% (18 Nov 2021 08:00) (97% - 100%)      I&O's Summary    17 Nov 2021 07:01  -  18 Nov 2021 07:00  --------------------------------------------------------  IN: 1700 mL / OUT: 2025 mL / NET: -325 mL    18 Nov 2021 07:01  -  18 Nov 2021 08:22  --------------------------------------------------------  IN: 0 mL / OUT: 400 mL / NET: -400 mL        MEDICATIONS  (STANDING):  aspirin  chewable 81 milliGRAM(s) Oral daily  dextrose 40% Gel 15 Gram(s) Oral once  dextrose 5%. 1000 milliLiter(s) (50 mL/Hr) IV Continuous <Continuous>  dextrose 5%. 1000 milliLiter(s) (100 mL/Hr) IV Continuous <Continuous>  dextrose 50% Injectable 25 Gram(s) IV Push once  dextrose 50% Injectable 12.5 Gram(s) IV Push once  dextrose 50% Injectable 25 Gram(s) IV Push once  enoxaparin Injectable 40 milliGRAM(s) SubCutaneous <User Schedule>  glucagon  Injectable 1 milliGRAM(s) IntraMuscular once  influenza   Vaccine 0.5 milliLiter(s) IntraMuscular once  insulin lispro (ADMELOG) corrective regimen sliding scale   SubCutaneous three times a day before meals  potassium phosphate / sodium phosphate Powder (PHOS-NaK) 1 Packet(s) Oral once      RESPIRATORY:        IMAGING:   Recent imaging studies were reviewed.    LAB RESULTS:                          11.5   3.26  )-----------( 170      ( 18 Nov 2021 04:07 )             36.0           11-18    142  |  111<H>  |  8.7  ----------------------------<  88  4.2   |  21.0<L>  |  0.53    Ca    8.2<L>      18 Nov 2021 04:07  Phos  2.8     11-18  Mg     1.9     11-18      -----------------------------------------------------------------------------------------------------------------------------------------------------------------------------------    PHYSICAL EXAM:  General: Calm, laying in bed  HEENT: MMM  Neuro:  -Mental status- No acute distress, AOx3, conversational, following commands  -CN- PERRL 3mm, EOMI, tongue midline, face symmetric  -Motor- full strength in all ext  -Sensation- intact to LT   -Coordination- no dysmetria noted    CV:   Pulm: Clear to auscultation  Abd: Soft, nontender, nondistended  Ext: No edema  Skin: warm, dry

## 2021-11-18 NOTE — PROGRESS NOTE ADULT - SUBJECTIVE AND OBJECTIVE BOX
Transfer Note     Hospital Course    61M PMH rheumatoid arthritis, polymyositis with baseline b/l upper extremity and RLE weakness, Raynaud's syndrome, only on holistic medication, presented to [facility] after acute onset altered mental status. Per daughter and patient, he awoke ~ 05:30 AM and was able to walk down the stairs, but while he was sitting on the couch he was noted to be nonverbal, have a left facial droop, and have difficulty moving his right side. On arrival to [facility], NIH 20, CT/CTA/CTP without acute findings. MRI/MRA brain also obtained without acute findings. 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  Symptoms fully resolved while being monitored here at [facility] now okay for downgrade    [facility] Division of Hospital Medicine    Chief Complaint:  Weakness     SUBJECTIVE / OVERNIGHT EVENTS:    Patient denies chest pain, SOB, abd pain, N/V, fever, chills, dysuria or any other complaints. All remainder ROS negative.     MEDICATIONS  (STANDING):  aspirin  chewable 81 milliGRAM(s) Oral daily  dextrose 40% Gel 15 Gram(s) Oral once  dextrose 5%. 1000 milliLiter(s) (50 mL/Hr) IV Continuous <Continuous>  dextrose 5%. 1000 milliLiter(s) (100 mL/Hr) IV Continuous <Continuous>  dextrose 50% Injectable 25 Gram(s) IV Push once  dextrose 50% Injectable 12.5 Gram(s) IV Push once  dextrose 50% Injectable 25 Gram(s) IV Push once  enoxaparin Injectable 40 milliGRAM(s) SubCutaneous <User Schedule>  glucagon  Injectable 1 milliGRAM(s) IntraMuscular once  influenza   Vaccine 0.5 milliLiter(s) IntraMuscular once  insulin lispro (ADMELOG) corrective regimen sliding scale   SubCutaneous three times a day before meals    MEDICATIONS  (PRN):        I&O's Summary    17 Nov 2021 07:01  -  18 Nov 2021 07:00  --------------------------------------------------------  IN: 1700 mL / OUT: 2025 mL / NET: -325 mL    18 Nov 2021 07:01  -  18 Nov 2021 11:09  --------------------------------------------------------  IN: 0 mL / OUT: 400 mL / NET: -400 mL        PHYSICAL EXAM:  Vital Signs Last 24 Hrs  T(C): 36.7 (18 Nov 2021 07:28), Max: 37 (17 Nov 2021 16:00)  T(F): 98.1 (18 Nov 2021 07:28), Max: 98.6 (17 Nov 2021 16:00)  HR: 80 (18 Nov 2021 10:00) (57 - 80)  BP: 129/95 (18 Nov 2021 10:00) (90/59 - 129/95)  BP(mean): 107 (18 Nov 2021 10:00) (67 - 107)  RR: 20 (18 Nov 2021 10:00) (11 - 23)  SpO2: 99% (18 Nov 2021 09:00) (97% - 100%)        CONSTITUTIONAL: NAD,    ENMT: Moist oral mucosa, no pharyngeal injection    RESPIRATORY: Normal respiratory effort; lungs are clear to auscultation   CARDIOVASCULAR: Regular rate and rhythm, normal S1 and S2, no murmur   ABDOMEN: Nontender to palpation, normoactive bowel sounds, no rebound   PSYCH: A+O to person, place, and time; affect appropriate  NEUROLOGY: CN 2-12 are intact and symmetric; no gross sensory deficits; Motor 4/5 on the right as compared to 5/5 on the left.  As per patient this is chronic   SKIN: No rashes; no palpable lesions    LABS:                        11.5   3.26  )-----------( 170      ( 18 Nov 2021 04:07 )             36.0     11-18    142  |  111<H>  |  8.7  ----------------------------<  88  4.2   |  21.0<L>  |  0.53    Ca    8.2<L>      18 Nov 2021 04:07  Phos  2.8     11-18  Mg     1.9     11-18                CAPILLARY BLOOD GLUCOSE      POCT Blood Glucose.: 86 mg/dL (18 Nov 2021 06:06)  POCT Blood Glucose.: 83 mg/dL (17 Nov 2021 22:14)  POCT Blood Glucose.: 100 mg/dL (17 Nov 2021 17:42)  POCT Blood Glucose.: 67 mg/dL (17 Nov 2021 16:22)  POCT Blood Glucose.: 88 mg/dL (17 Nov 2021 15:17)  POCT Blood Glucose.: 76 mg/dL (17 Nov 2021 13:41)  POCT Blood Glucose.: 73 mg/dL (17 Nov 2021 13:34)  POCT Blood Glucose.: 58 mg/dL (17 Nov 2021 11:10)        RADIOLOGY & ADDITIONAL TESTS:  Results Reviewed:   Imaging Personally Reviewed:  Electrocardiogram Personally Reviewed:

## 2021-11-18 NOTE — PHYSICAL THERAPY INITIAL EVALUATION ADULT - MUSCLE TONE ASSESSMENT, REHAB EVAL
"   LOS: 9 days    Patient Care Team:  Chayito Paredes MD as PCP - General (Internal Medicine)  Johnson Turner DPM as PCP - Claims Attributed  Dano Art MD as Consulting Physician (Hematology and Oncology)  Navarro Hung MD as Referring Physician (Nephrology)    Chief Complaint:    Chief Complaint   Patient presents with   • Fatigue     Follow up acute kidney injury on chronic kidney disease  Subjective     Interval History:   Had HD earlier today complicated by bradycardia; blood pressure marginal but stable; not eating; no S OB on RA; still feels puffy but better than yesterday    Review of Systems:   As noted above    Objective     Vital Signs  Temp:  [97.1 °F (36.2 °C)-97.7 °F (36.5 °C)] 97.7 °F (36.5 °C)  Heart Rate:  [82-93] 93  Resp:  [16-20] 16  BP: (107-138)/(33-78) 107/78    Flowsheet Rows      First Filed Value   Admission Height  160 cm (63\") Documented at 04/03/2019 1500   Admission Weight  94.9 kg (209 lb 3.2 oz) Documented at 04/03/2019 1500          I/O this shift:  In: 240 [P.O.:240]  Out: 3000 [Other:3000]  I/O last 3 completed shifts:  In: 480 [P.O.:480]  Out: -     Intake/Output Summary (Last 24 hours) at 4/12/2019 1811  Last data filed at 4/12/2019 1354  Gross per 24 hour   Intake 240 ml   Output 3000 ml   Net -2760 ml       Physical Exam:  General Appearance: Awake, confused and disoriented tho very pleasant, no acute distress, obese, chronically ill  Skin: warm and dry  HEENT: pupils round and reactive to light, oral mucosa normal   Neck: supple, no JVD, trachea midline  Lungs: Decreased breath sounds bilaterally, unlabored breathing effort  Heart: irreg irreg, normal S1 and S2, no S3, no rub  Abdomen: soft, non-tender, present bowel sounds to auscultation; +distended  :  No palpable bladder, no CVA tenderness  Extremities: 2+ pretibial edema extending to her hips, no cyanosis or clubbing  Neuro: Normal speech but the patient is confused; moving all " extremities  Skin: right chest TDC with mild bruising            Results Review:    Results from last 7 days   Lab Units 04/12/19  0631 04/11/19  0707 04/10/19  0612 04/06/19  0437   SODIUM mmol/L 133* 140 136   < > 139   POTASSIUM mmol/L 3.5 3.8 3.8   < > 4.2   CHLORIDE mmol/L 98 103 105   < > 111*   CO2 mmol/L 19.1* 21.1* 15.2*   < > 13.3*   BUN mg/dL 48* 38* 56*   < > 77*   CREATININE mg/dL 4.29* 3.42* 3.98*   < > 4.68*   CALCIUM mg/dL 8.0* 7.9* 8.1*   < > 7.9*   BILIRUBIN mg/dL  --  3.9*  --   --  2.8*   ALK PHOS U/L  --  114  --   --  118*   ALT (SGPT) U/L  --  14  --   --  18   AST (SGOT) U/L  --  124*  --   --  97*   GLUCOSE mg/dL 128* 101* 132*   < > 130*    < > = values in this interval not displayed.       Estimated Creatinine Clearance: 10.9 mL/min (A) (by C-G formula based on SCr of 4.29 mg/dL (H)).    Results from last 7 days   Lab Units 04/12/19  0631 04/11/19  0707 04/09/19  0630 04/07/19 0615 04/06/19  0437   MAGNESIUM mg/dL  --  2.2  --   --  2.6* 2.6*   PHOSPHORUS mg/dL 5.2* 4.1 4.6*   < > 6.2* 5.9*    < > = values in this interval not displayed.       Results from last 7 days   Lab Units 04/07/19  0615 04/06/19  0437   URIC ACID mg/dL 4.9 4.5       Results from last 7 days   Lab Units 04/11/19  0707 04/09/19  0459 04/08/19  0459 04/07/19 0615 04/06/19  0437   WBC 10*3/mm3 2.95* 2.67*  2.67* 2.67* 2.73* 2.81*   HEMOGLOBIN g/dL 11.0* 11.1* 10.6* 10.7* 10.7*   PLATELETS 10*3/mm3 53* 62* 70* 80* 73*               Imaging Results (last 24 hours)     ** No results found for the last 24 hours. **          allopurinol 100 mg Oral Daily   heparin (porcine) 5,000 Units Subcutaneous Q12H   insulin lispro 0-7 Units Subcutaneous 4x Daily With Meals & Nightly   lactulose 30 g Oral Daily   metoprolol tartrate 12.5 mg Oral Nightly   pantoprazole 40 mg Oral Q AM   QUEtiapine 25 mg Oral Daily With Lunch   QUEtiapine 50 mg Oral Nightly   rifaximin 550 mg Oral Daily With Lunch & Dinner   sertraline 25 mg Oral  Daily   vitamin B-12 1,000 mcg Oral Daily       sodium chloride 9 mL/hr Last Rate: 9 mL/hr (04/08/19 0851)       Medication Review:   Current Facility-Administered Medications   Medication Dose Route Frequency Provider Last Rate Last Dose   • acetaminophen (TYLENOL) tablet 650 mg  650 mg Oral Q4H PRN Brice Le MD   650 mg at 04/08/19 1705   • albumin human 25 % IV SOLN 12.5 g  12.5 g Intravenous PRN Navarro Hung MD       • allopurinol (ZYLOPRIM) tablet 100 mg  100 mg Oral Daily Mack Pruitt MD   100 mg at 04/12/19 1318   • dextrose (D50W) 25 g/ 50mL Intravenous Solution 25 g  25 g Intravenous Q15 Min PRN Brice Le MD       • dextrose (GLUTOSE) oral gel 15 g  15 g Oral Q15 Min PRN Brice Le MD       • glucagon (human recombinant) (GLUCAGEN DIAGNOSTIC) injection 1 mg  1 mg Subcutaneous PRN Brice Le MD       • heparin (porcine) 5000 UNIT/ML injection 5,000 Units  5,000 Units Subcutaneous Q12H Forrest Avilez MD   5,000 Units at 04/11/19 2053   • insulin lispro (humaLOG) injection 0-7 Units  0-7 Units Subcutaneous 4x Daily With Meals & Nightly Brice Le MD   2 Units at 04/11/19 2053   • lactulose (CHRONULAC) 10 GM/15ML solution 30 g  30 g Oral Daily Brice Le MD   30 g at 04/10/19 0834   • melatonin tablet 10 mg  10 mg Oral Nightly PRN Brice Le MD       • metoprolol tartrate (LOPRESSOR) tablet 12.5 mg  12.5 mg Oral Nightly Brice Le MD   12.5 mg at 04/11/19 2053   • nitroglycerin (NITROSTAT) SL tablet 0.4 mg  0.4 mg Sublingual Q5 Min PRN Brice Le MD       • ondansetron (ZOFRAN) tablet 4 mg  4 mg Oral Q6H PRN Brice Le MD        Or   • ondansetron ODT (ZOFRAN-ODT) disintegrating tablet 4 mg  4 mg Oral Q6H PRN Brice Le MD        Or   • ondansetron (ZOFRAN) injection 4 mg  4 mg Intravenous Q6H PRN Brice Le MD   4 mg at 04/06/19 0915   • pantoprazole  (PROTONIX) EC tablet 40 mg  40 mg Oral Q AM Navarro Hung MD   40 mg at 04/11/19 0645   • QUEtiapine (SEROquel) tablet 25 mg  25 mg Oral Daily With Lunch Brice Le MD   25 mg at 04/12/19 1319   • QUEtiapine (SEROquel) tablet 50 mg  50 mg Oral Nightly Brice Le MD   50 mg at 04/11/19 2053   • rifaximin (XIFAXAN) tablet 550 mg  550 mg Oral Daily With Lunch & Dinner Brice Le MD   550 mg at 04/12/19 1319   • sertraline (ZOLOFT) tablet 25 mg  25 mg Oral Daily Brice Le MD   25 mg at 04/12/19 1318   • sodium chloride 0.9 % flush 3-10 mL  3-10 mL Intravenous PRN Brice Le MD       • sodium chloride 0.9 % infusion  9 mL/hr Intravenous Continuous Johnson Dale MD 9 mL/hr at 04/08/19 0851     • traMADol (ULTRAM) tablet 50 mg  50 mg Oral Q12H PRN Brice Le MD       • vitamin B-12 (CYANOCOBALAMIN) tablet 1,000 mcg  1,000 mcg Oral Daily Brice Le MD   1,000 mcg at 04/12/19 1319       Assessment/Plan   1.  Acute kidney injury on chronic kidney disease, multifactorial and progressive to likely ESRD.  Vol excess; improving acidosis; stable K.  Had first HD 4.8.19 and second 4.10.19  2.  Chronic kidney disease stage V at baseline associated with diabetic nephropathy, biopsy-proven.  Baseline creatinine had been in 3-3.5 range  3.  Diabetes mellitus type 2 with known diabetic nephropathy and retinopathy  4.  Cirrhosis, associated with HOANG, and questionable hemochromatosis.  The patient has had hepatic encephalopathy often associated with noncompliance with lactulose  5.  Anemia with chronic kidney disease  6.  Thrombocytopenia with known history of chronic thrombocytopenia  7.  History of gout  8.  Hypertension reasonably controlled  9.  Dementia       Plan:  1.  Will ask [facility]/Palliative to meet with pt and family and review options  2.  Surveillance labs  3.  D/w Dr. Belkis Hung MD  04/12/19  6:11 PM     bilateral upper extremities/bilateral lower extremities/normal

## 2021-11-18 NOTE — PROGRESS NOTE ADULT - ASSESSMENT
61M PMH rheumatoid arthritis, polymyositis with baseline b/l upper extremity and RLE weakness, Raynaud's syndrome, only on holistic medication, presented to [facility] after acute onset altered mental status most likely secondary to TIA

## 2021-11-18 NOTE — OCCUPATIONAL THERAPY INITIAL EVALUATION ADULT - GENERAL OBSERVATIONS, REHAB EVAL
Pt received semifowler in bed, +bilateral VCDs, +IV, +continuous EEG, +cardiac monitor with , agreeable to OT eval.

## 2021-11-18 NOTE — OCCUPATIONAL THERAPY INITIAL EVALUATION ADULT - ADDITIONAL COMMENTS
Pt lives in private home with  4 steps to enter, bilateral HRs, wideset.  Once inside there is a full flight of stairs, 1 HR to bedroom.   Pt drives.  He has no medical equipment.

## 2021-11-18 NOTE — PHYSICAL THERAPY INITIAL EVALUATION ADULT - MANUAL MUSCLE TESTING RESULTS, REHAB EVAL
See OT eval for UEs; right LE hip flexion 3+/5, knee ext 4/5, ankle Df and PF 5/5, left LE 5/5. pt. reports chronic weakness of right LE

## 2021-11-18 NOTE — PROGRESS NOTE ADULT - ASSESSMENT
61M PMH rheumatoid arthritis, polymyositis with baseline b/l upper extremity and RLE weakness, Raynaud's syndrome,  presented to [facility] after acute onset altered mental status 05:30 AM nonverbal, facial droop, right hemiparesis, ataxia. 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 prompted transfer to [facility] for evaluation and monitoring    NEURO:  - Q1 neuro checks in NSICU  - vEEG to rule out seizure  - Stroke core measures: [place] A1C 5.4, TSH 1.07, LDL 85  - Activity: [x] mobilize as tolerated [] Bedrest [x] PT [x] OT [] PMNR    PULM:  - SpO2 goal > 92%    CV:  - SBP goal 100-160  - TTE     RENAL:  - Fluids: NS @ 75 until tolerating diet  - Monitor I/Os    GI:  - Diet: Advance as tolerated once passes dysphagia  - GI prophylaxis [x] not indicated [] PPI [] other:  - Bowel regimen [] colace [x] senna [] other:    ENDO:   - Goal euglycemia (-180)    HEME/ONC:  - VTE prophylaxis: [x] SCDs [] chemoprophylaxis [] hold chemoprophylaxis due to: [] high risk of DVT/PE on admission due to:    ID:  - Monitor for fever/leukoctyosis    SOCIAL/FAMILY:  [] awaiting [x] updated at bedside [] family meeting    CODE STATUS:  [x] Full Code [] DNR [] DNI [] Palliative/Comfort Care   61M PMH rheumatoid arthritis, polymyositis with baseline b/l upper extremity and RLE weakness, Raynaud's syndrome,  presented to [facility] after acute onset altered mental status 05:30 AM nonverbal, facial droop, right hemiparesis, ataxia. 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 prompted transfer to [facility] for evaluation and monitoring    NEURO:  - B4eynmd checks in NSICU  - vEEG to rule out seizure  - further stroke work up per stroke service  - Stroke core measures: [place] A1C 5.4, TSH 1.07, LDL 85  - Activity: [x] mobilize as tolerated [] Bedrest [x] PT [x] OT [] PMNR    PULM:  - SpO2 goal > 92%    CV:  - SBP goal 100-160  - TTE     RENAL:  - voiding  - Monitor I/Os    GI:  - Diet: Advance as tolerated    - GI prophylaxis [x] not indicated [] PPI [] other:  - Bowel regimen [] colace [x] senna [] other:    ENDO:   - Goal euglycemia (-180)    HEME/ONC:  - VTE prophylaxis: [x] SCDs [] chemoprophylaxis [] hold chemoprophylaxis due to: [] high risk of DVT/PE on admission due to:    ID:  - Monitor for fever/leukoctyosis    SOCIAL/FAMILY:  [] awaiting [x] updated at bedside [] family meeting    CODE STATUS:  [x] Full Code [] DNR [] DNI [] Palliative/Comfort Care

## 2021-11-18 NOTE — DISCHARGE NOTE NURSING/CASE MANAGEMENT/SOCIAL WORK - PATIENT PORTAL LINK FT
You can access the FollowMyHealth Patient Portal offered by [facility] by registering at the following website: [email or web address]. By joining [employer]’s FollowMyHealth portal, you will also be able to view your health information using other applications (apps) compatible with our system.

## 2021-11-18 NOTE — CONSULT NOTE ADULT - ASSESSMENT
61M PMH rheumatoid arthritis, polymyositis with baseline b/l upper extremity and RLE weakness, Raynaud's syndrome presents to [facility] as a transfer from [place] for concern for basilar occlusion syndrome.    R/O Cardioembolic Stroke   -CTA Brain/Neck/ CT Perfusion- negative, patent intracranial circulation  -ECst degree AVB NSR HR @ 71, with RBBB   -Tele-SB/NSR HR @ 50-60s  -Patient states all presenting symptoms resolved at time of assessment  -Cont. to monitor  -Plan for KENJI in AM, maintain NPO after MN  -If KENJI negative, plan for ILR placement by discharge 
IMPRESSION:  - R/O posterior circulation TIA.    ASSESSMENT/ PLAN:     - Admit to NSCU  - Neuro checks and vital signs Q 1 hours.  - SBP goal permissive upto 220/120 for 48 hours.  - ASA 81 mg POQD.  - Lipitor 80 mg PO QD for LDL goal of < 70.  - Telemetry monitoring.  - Check fasting Lipid panel and HbA1c  - TTE -report as above noted.  - PT OT SLP evaluation.  - SCD/ SQ Lovenox for DVT prophylaxis.

## 2021-11-18 NOTE — CONSULT NOTE ADULT - SUBJECTIVE AND OBJECTIVE BOX
[facility]                                                               Office:  [address] [address]                                                              Telephone: [phone]. Fax:[phone]                                                                        CARDIOLOGY CONSULTATION NOTE                                                                                             Consult requested by:  Dr. Ibarra  Reason for Consultation: Stroke  Primary Cardiology: [facility]  History obtained by: Patient and medical record   obtained: No    Chief complaint:    Patient is a 61y old  Male who presents with a chief complaint of Acute onset hemiparesis and difficulty speaking (2021 11:07)        HPI:  61M PMH rheumatoid arthritis, polymyositis with baseline b/l upper extremity and RLE weakness, Raynaud's syndrome, only on holistic medication, presented to [facility] after acute onset altered mental status earlier this AM, last known well 21:30 last night when he went to bed. Per daughter and patient, he awoke ~ 05:30 AM and was able to walk down the stairs, but while he was sitting on the couch he was noted to be nonverbal, have a left facial droop, and have difficulty moving his right side. On arrival to [facility], NIH 20, CT/CTA/CTP without acute findings. MRI/MRA brain also obtained without acute findings. 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 Hayden  Performing Provider: DAVID  NPO Date: 21 [id number]  Procedure Date: 21   Procedure Time: TBD  Procedure Site:   KENJI Lab  Confirmed with KENJI Lab:   Consenting Party: Self       PRE-PROCEDURE QUESTIONS:  On Anticoagulation?   NO  Previous Endoscopy?  NO  Hx of CVA?  NO  Bariatric Surgery?  NO  Respiratory Tolerance?  NO  Intubated?  NO  Sleep Apnea?  NO  ETOH/ Recreational Drug use?  NO      RELATIVE CONTRAINDICATIONS:  Large Diaphragmatic hernia?  NO  Atlantoaxial disease of the neck?  NO  Severe cervical arthritis?  NO  Extensive Radiation to mediastinum?  NO  GI bleed?  NO  Dysphagia?  NO  Odynophagia?  NO      ABSOLUTE CONTRAINDICATIONS:  Esophageal Diverticulitis?  NO  Esophageal Strictures?  NO  Esophageal Ring?  NO  Esophageal Spasm?  NO  Esophageal Laceration?  NO      LABS:    See Below      REVIEW OF SYMPTOMS:     CONSTITUTIONAL: No fever, weight loss, or fatigue  ENMT:  No difficulty hearing, tinnitus, vertigo; No sinus or throat pain  NECK: No pain or stiffness  CARDIOVASCULAR: See HPI  RESPIRATORY: No Dyspnea on exertion, Shortness of breath, cough, wheezing  : No dysuria, no hematuria   GI: No dark color stool, no melena, no diarrhea, no constipation, no abdominal pain   NEURO: No headache, no dizziness, no slurred speech   MUSCULOSKELETAL: No joint pain or swelling; No muscle, back, or extremity pain  PSYCH: No agitation, no anxiety.    ALL OTHER REVIEW OF SYSTEMS ARE NEGATIVE.      PREVIOUS DIAGNOSTIC TESTING    ECHO FINDINGS: < from: TTE Echo Complete w/ Contrast w/ Doppler (21 @ 17:26) >  Summary:   1. Left ventricular ejection fraction, by visual estimation, is 70 to 75%.   2. Normal global left ventricular systolic function.   3. Diastolic function is normal.   4. Normal right ventricular size and function, estimated PASP 33 mmHg.   5. Trace mitral valve regurgitation.   6. Mild tricuspid regurgitation.   7. There is no evidence of pericardial effusion.      CARDIAC CATHETERIZATION: 4-5 years      ALLERGIES: Allergies    No Known Allergies    Intolerances      PAST MEDICAL HISTORY  Rheumatoid arthritis    Raynaud disease    History of myositis        PAST SURGICAL HISTORY  No significant past surgical history        FAMILY HISTORY:  Pt denies family history of MI, stroke      SOCIAL HISTORY:    CIGARETTES: second hand smoke d/t his wife  ALCOHOL: Denies  DRUGS: Denies       CURRENT MEDICATIONS:  aspirin  chewable  dextrose 40% Gel  dextrose 5%.  dextrose 5%.  dextrose 50% Injectable  dextrose 50% Injectable  dextrose 50% Injectable  enoxaparin Injectable  glucagon  Injectable  influenza   Vaccine  insulin lispro (ADMELOG) corrective regimen sliding scale        HOME MEDICATIONS:        Vital Signs Last 24 Hrs  T(C): 36.7 (2021 11:30), Max: 37 (2021 16:00)  T(F): 98.1 (2021 11:30), Max: 98.6 (2021 16:00)  HR: 75 (2021 12:00) (57 - 80)  BP: 113/66 (2021 12:00) (90/59 - 129/95)  BP(mean): 80 (2021 12:00) (67 - 107)  RR: 19 (2021 12:00) (11 - 23)  SpO2: 99% (2021 09:00) (97% - 100%)      PHYSICAL EXAM:  Constitutional: Comfortable . No acute distress.   HEENT: Atraumatic and normocephalic , neck is supple . no JVD. No carotid bruit. PEERL   CNS: A&Ox3. No focal deficits. EOMI.    Lymph Nodes: Cervical : Not palpable.  Respiratory: CTAB  Cardiovascular: S1S2 RRR. No murmur/rubs or gallop.  Gastrointestinal: Soft non-tender and non distended . +Bowel sounds. negative Randall's sign.  Extremities: No edema.   Psychiatric: Calm . no agitation.  Skin: No skin rash/ulcers visualized to face, hands or feet      Intake and output:    @ 07:  -   @ 07:00  --------------------------------------------------------  IN: 1700 mL / OUT: 2025 mL / NET: -325 mL     @ 07:01  -   @ 15:54  --------------------------------------------------------  IN: 0 mL / OUT: 400 mL / NET: -400 mL        LABS:                        11.5   3.26  )-----------( 170      ( 2021 04:07 )             36.0         142  |  111<H>  |  8.7  ----------------------------<  88  4.2   |  21.0<L>  |  0.53    Ca    8.2<L>      2021 04:07  Phos  2.8       Mg     1.9         INTERPRETATION OF TELEMETRY: SB/NSR HR @ 50-60s  ECst degree AVB NSR HR @ 71, with RBBB

## 2021-11-18 NOTE — PROGRESS NOTE ADULT - PROBLEM SELECTOR PLAN 1
Signs and symptoms consistent with such   MRI of brain negative, with concern for possible artifact   Continue ASA and Lipitor  Discussed with neurology attending Dr. Luis- repeat MRI of brain placed.  If not completed by tomorrow morning patient can follow up outpatient.      PT pending

## 2021-11-18 NOTE — PHYSICAL THERAPY INITIAL EVALUATION ADULT - PERTINENT HX OF CURRENT PROBLEM, REHAB EVAL
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 All imaging negative. Patient with complete resolution of symptoms and back to baseline.

## 2021-11-18 NOTE — OCCUPATIONAL THERAPY INITIAL EVALUATION ADULT - RANGE OF MOTION EXAMINATION, UPPER EXTREMITY
Right shoulder active ROM to 90 degrees flexion/abduction.  Right shoulder active assisted (self) ROM WFL.  Right elbow to digits WFL actively./Left UE Active ROM was WNL (within normal limits)

## 2021-11-18 NOTE — EEG REPORT - NS EEG TEXT BOX
[facility]   COMPREHENSIVE EPILEPSY CENTER   REPORT OF LONG-TERM VIDEO EEG     [facility]: [address], [place] [address], Ph#: [phone]  [facility]: [id number], [place] [address], Ph#: [phone]  [facility]: [address] [place] [address], Ph#: [phone]    Patient Name: LESLI SCHROEDER  Age and : 61y ([birth year])  MRN #: [id number]  Location: [address] [id number]  Referring Physician: Shirin Westfall    Start Time/Date: 02:37 on 21  End Time/Date: 08:00 on 21  Duration: 5h 20m    _____________________________________________________________  STUDY INFORMATION    EEG Recording Technique:  The patient underwent continuous Video-EEG monitoring, using Telemetry System hardware on the XLTek Digital System. EEG and video data were stored on a computer hard drive with important events saved in digital archive files. The material was reviewed by a physician (electroencephalographer / epileptologist) on a daily basis. Pool and seizure detection algorithms were utilized and reviewed. An EEG Technician attended to the patient, and was available throughout daytime work hours.  The epilepsy center neurologist was available in person or on call 24-hours per day.    EEG Placement and Labeling of Electrodes:  The EEG was performed utilizing 20 channel referential EEG connections (coronal over temporal over parasagittal montage) using all standard 10-20 electrode placements with EKG, with additional electrodes placed in the inferior temporal region using the modified 10-10 montage electrode placements for elective admissions, or if deemed necessary. Recording was at a sampling rate of 256 samples per second per channel. Time synchronized digital video recording was done simultaneously with EEG recording. A low light infrared camera was used for low light recording.     _____________________________________________________________  HISTORY    Patient is a 61y old  Male who presents with a chief complaint of Acute onset right hemiparesis and difficulty speaking (2021 08:21)      PERTINENT MEDICATION:  none    _____________________________________________________________  STUDY INTERPRETATION    Findings: The background was continuous and reactive. During wakefulness, the posterior dominant rhythm consisted of symmetric, well-modulated 9 Hz activity, with amplitude to 40 uV, that attenuated to eye opening.      Background Slowing:  No generalized background slowing was present.    Focal Slowing:   None were present.    Sleep Background:  Drowsiness was characterized by fragmentation, attenuation, and slowing of the background activity.    Sleep was characterized by the presence of vertex waves, symmetric sleep spindles and K-complexes.    Other Non-Epileptiform Findings:  None were present.    Interictal Epileptiform Activity:   None were present.    Events:  Clinical events: None recorded.  Seizures: None recorded.    Activation Procedures:   Hyperventilation was not performed.    Photic stimulation was performed and did not elicit any abnormality.     Artifacts:  Intermittent myogenic and movement artifacts were noted.    ECG:  The heart rate on single channel ECG was predominantly between 60-70 BPM.    _____________________________________________________________  EEG SUMMARY/CLASSIFICATION    Normal EEG in the awake, drowsy and asleep states.    _____________________________________________________________  EEG IMPRESSION/CLINICAL CORRELATE    Normal EEG study.  No epileptiform pattern or seizure seen.    _____________________________________________________________    Kelvin Smith MD  Director, Epilepsy/EMU - [facility]

## 2021-11-18 NOTE — PHYSICAL THERAPY INITIAL EVALUATION ADULT - ADDITIONAL COMMENTS
Pt. lives in a house with his wife and daughter.  His spouse has limited ability to assist. 4 YURIDIA with b/l rails (1 in each) and 12 inside with one rail. Pt. was independent PTA and does not own DME.

## 2021-11-19 ENCOUNTER — TRANSCRIPTION ENCOUNTER (OUTPATIENT)
Age: 61
End: 2021-11-19

## 2021-11-19 LAB
ALBUMIN SERPL ELPH-MCNC: 3.2 G/DL — LOW (ref 3.3–5.2)
ALP SERPL-CCNC: 46 U/L — SIGNIFICANT CHANGE UP (ref 40–120)
ALT FLD-CCNC: 28 U/L — SIGNIFICANT CHANGE UP
ANION GAP SERPL CALC-SCNC: 9 MMOL/L — SIGNIFICANT CHANGE UP (ref 5–17)
AST SERPL-CCNC: 42 U/L — HIGH
BILIRUB DIRECT SERPL-MCNC: 0.1 MG/DL — SIGNIFICANT CHANGE UP (ref 0–0.3)
BILIRUB INDIRECT FLD-MCNC: 0.2 MG/DL — SIGNIFICANT CHANGE UP (ref 0.2–1)
BILIRUB SERPL-MCNC: 0.3 MG/DL — LOW (ref 0.4–2)
BUN SERPL-MCNC: 12.5 MG/DL — SIGNIFICANT CHANGE UP (ref 8–20)
CALCIUM SERPL-MCNC: 8.1 MG/DL — LOW (ref 8.6–10.2)
CHLORIDE SERPL-SCNC: 111 MMOL/L — HIGH (ref 98–107)
CK MB CFR SERPL CALC: 14.8 NG/ML — HIGH (ref 0–6.7)
CK SERPL-CCNC: 303 U/L — HIGH (ref 30–200)
CO2 SERPL-SCNC: 23 MMOL/L — SIGNIFICANT CHANGE UP (ref 22–29)
CREAT SERPL-MCNC: 0.6 MG/DL — SIGNIFICANT CHANGE UP (ref 0.5–1.3)
GLUCOSE BLDC GLUCOMTR-MCNC: 45 MG/DL — CRITICAL LOW (ref 70–99)
GLUCOSE BLDC GLUCOMTR-MCNC: 60 MG/DL — LOW (ref 70–99)
GLUCOSE BLDC GLUCOMTR-MCNC: 64 MG/DL — LOW (ref 70–99)
GLUCOSE BLDC GLUCOMTR-MCNC: 68 MG/DL — LOW (ref 70–99)
GLUCOSE BLDC GLUCOMTR-MCNC: 69 MG/DL — LOW (ref 70–99)
GLUCOSE BLDC GLUCOMTR-MCNC: 70 MG/DL — SIGNIFICANT CHANGE UP (ref 70–99)
GLUCOSE BLDC GLUCOMTR-MCNC: 70 MG/DL — SIGNIFICANT CHANGE UP (ref 70–99)
GLUCOSE BLDC GLUCOMTR-MCNC: 72 MG/DL — SIGNIFICANT CHANGE UP (ref 70–99)
GLUCOSE BLDC GLUCOMTR-MCNC: 73 MG/DL — SIGNIFICANT CHANGE UP (ref 70–99)
GLUCOSE BLDC GLUCOMTR-MCNC: 77 MG/DL — SIGNIFICANT CHANGE UP (ref 70–99)
GLUCOSE BLDC GLUCOMTR-MCNC: 87 MG/DL — SIGNIFICANT CHANGE UP (ref 70–99)
GLUCOSE SERPL-MCNC: 93 MG/DL — SIGNIFICANT CHANGE UP (ref 70–99)
HCT VFR BLD CALC: 37.2 % — LOW (ref 39–50)
HGB BLD-MCNC: 12.2 G/DL — LOW (ref 13–17)
MAGNESIUM SERPL-MCNC: 1.9 MG/DL — SIGNIFICANT CHANGE UP (ref 1.6–2.6)
MCHC RBC-ENTMCNC: 30 PG — SIGNIFICANT CHANGE UP (ref 27–34)
MCHC RBC-ENTMCNC: 32.8 GM/DL — SIGNIFICANT CHANGE UP (ref 32–36)
MCV RBC AUTO: 91.6 FL — SIGNIFICANT CHANGE UP (ref 80–100)
PHOSPHATE SERPL-MCNC: 3.5 MG/DL — SIGNIFICANT CHANGE UP (ref 2.4–4.7)
PLATELET # BLD AUTO: 176 K/UL — SIGNIFICANT CHANGE UP (ref 150–400)
POTASSIUM SERPL-MCNC: 4.5 MMOL/L — SIGNIFICANT CHANGE UP (ref 3.5–5.3)
POTASSIUM SERPL-SCNC: 4.5 MMOL/L — SIGNIFICANT CHANGE UP (ref 3.5–5.3)
PROT SERPL-MCNC: 6.1 G/DL — LOW (ref 6.6–8.7)
RBC # BLD: 4.06 M/UL — LOW (ref 4.2–5.8)
RBC # FLD: 14.7 % — HIGH (ref 10.3–14.5)
SARS-COV-2 RNA SPEC QL NAA+PROBE: SIGNIFICANT CHANGE UP
SODIUM SERPL-SCNC: 143 MMOL/L — SIGNIFICANT CHANGE UP (ref 135–145)
WBC # BLD: 3.1 K/UL — LOW (ref 3.8–10.5)
WBC # FLD AUTO: 3.1 K/UL — LOW (ref 3.8–10.5)

## 2021-11-19 PROCEDURE — 99233 SBSQ HOSP IP/OBS HIGH 50: CPT

## 2021-11-19 PROCEDURE — 70551 MRI BRAIN STEM W/O DYE: CPT | Mod: 26

## 2021-11-19 PROCEDURE — 93010 ELECTROCARDIOGRAM REPORT: CPT

## 2021-11-19 PROCEDURE — 33285 INSJ SUBQ CAR RHYTHM MNTR: CPT

## 2021-11-19 PROCEDURE — 99232 SBSQ HOSP IP/OBS MODERATE 35: CPT

## 2021-11-19 PROCEDURE — 93325 DOPPLER ECHO COLOR FLOW MAPG: CPT | Mod: 26

## 2021-11-19 PROCEDURE — 93320 DOPPLER ECHO COMPLETE: CPT | Mod: 26

## 2021-11-19 PROCEDURE — 93312 ECHO TRANSESOPHAGEAL: CPT | Mod: 26

## 2021-11-19 RX ORDER — MAGNESIUM OXIDE 400 MG ORAL TABLET 241.3 MG
400 TABLET ORAL
Refills: 0 | Status: DISCONTINUED | OUTPATIENT
Start: 2021-11-19 | End: 2021-11-19

## 2021-11-19 RX ORDER — DEXTROSE 50 % IN WATER 50 %
25 SYRINGE (ML) INTRAVENOUS ONCE
Refills: 0 | Status: COMPLETED | OUTPATIENT
Start: 2021-11-19 | End: 2021-11-19

## 2021-11-19 RX ORDER — GLUCAGON INJECTION, SOLUTION 0.5 MG/.1ML
1 INJECTION, SOLUTION SUBCUTANEOUS ONCE
Refills: 0 | Status: COMPLETED | OUTPATIENT
Start: 2021-11-19 | End: 2021-11-19

## 2021-11-19 RX ORDER — METHYLPREDNISOLONE 4 MG
500 TABLET ORAL
Refills: 0 | Status: DISCONTINUED | OUTPATIENT
Start: 2021-11-19 | End: 2021-11-19

## 2021-11-19 RX ORDER — ASPIRIN/CALCIUM CARB/MAGNESIUM 324 MG
1 TABLET ORAL
Qty: 0 | Refills: 0 | DISCHARGE
Start: 2021-11-19

## 2021-11-19 RX ORDER — CEFAZOLIN SODIUM 1 G
2000 VIAL (EA) INJECTION ONCE
Refills: 0 | Status: DISCONTINUED | OUTPATIENT
Start: 2021-11-19 | End: 2021-11-20

## 2021-11-19 RX ORDER — SODIUM CHLORIDE 9 MG/ML
1000 INJECTION, SOLUTION INTRAVENOUS
Refills: 0 | Status: COMPLETED | OUTPATIENT
Start: 2021-11-19 | End: 2021-11-19

## 2021-11-19 RX ADMIN — SODIUM CHLORIDE 75 MILLILITER(S): 9 INJECTION, SOLUTION INTRAVENOUS at 09:09

## 2021-11-19 RX ADMIN — Medication 25 GRAM(S): at 12:45

## 2021-11-19 RX ADMIN — ENOXAPARIN SODIUM 40 MILLIGRAM(S): 100 INJECTION SUBCUTANEOUS at 18:01

## 2021-11-19 RX ADMIN — MAGNESIUM OXIDE 400 MG ORAL TABLET 400 MILLIGRAM(S): 241.3 TABLET ORAL at 18:01

## 2021-11-19 RX ADMIN — Medication 25 GRAM(S): at 12:13

## 2021-11-19 RX ADMIN — Medication 81 MILLIGRAM(S): at 15:12

## 2021-11-19 RX ADMIN — Medication 25 GRAM(S): at 14:48

## 2021-11-19 NOTE — CONSULT NOTE ADULT - SUBJECTIVE AND OBJECTIVE BOX
61 year old male patient with a history of rheumatoid arthritis, polymyositis with baseline b/l upper extremity and RLE weakness, Raynaud's syndrome, only on holistic medication who initially presented to [facility] after acute onset altered mental status. When he awoke ~ 05:30 AM and was able to walk down the stairs, but while he was sitting on the couch he was noted to be nonverbal, have a left facial droop, and have difficulty moving his right side. On arrival to [facility], NIH 20, CT/CTA/CTP without acute findings. MRI/MRA brain also obtained without acute findings. 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 prompted transfer to [facility] for evaluation and monitoring    PAST MEDICAL & SURGICAL HISTORY:  Rheumatoid arthritis  Raynaud disease  History of myositis  No significant past surgical history    REVIEW OF SYSTEMS  General: - fever or chills  Skin/Breast: - rashes  Ophthalmologic: - blurred vision	  ENMT: - sore throat  Respiratory and Thorax: - cough, or dyspnea	  Cardiovascular: - chest pain or palpitaitons  Gastrointestinal:	- N/V/D/C  Genitourinary: - dysuria  Musculoskeletal:	 - arthritis  Neurological: - weaknesses  Psychiatric: - anxiety    MEDICATIONS  (STANDING):  aspirin  chewable 81 milliGRAM(s) Oral daily  dextrose 40% Gel 15 Gram(s) Oral once  dextrose 5%. 1000 milliLiter(s) (50 mL/Hr) IV Continuous <Continuous>  dextrose 5%. 1000 milliLiter(s) (100 mL/Hr) IV Continuous <Continuous>  dextrose 50% Injectable 25 Gram(s) IV Push once  dextrose 50% Injectable 12.5 Gram(s) IV Push once  dextrose 50% Injectable 25 Gram(s) IV Push once  enoxaparin Injectable 40 milliGRAM(s) SubCutaneous <User Schedule>  influenza   Vaccine 0.5 milliLiter(s) IntraMuscular once  insulin lispro (ADMELOG) corrective regimen sliding scale   SubCutaneous three times a day before meals  magnesium oxide 400 milliGRAM(s) Oral two times a day with meals    Allergies  No Known Allergies    SOCIAL HISTORY: non smoker, non drinker    FAMILY HISTORY: no hx of Afib in the family    Vital Signs Last 24 Hrs  T(C): 36.8 (19 Nov 2021 12:40), Max: 36.8 (19 Nov 2021 12:40)  T(F): 98.3 (19 Nov 2021 12:40), Max: 98.3 (19 Nov 2021 12:40)  HR: 66 (19 Nov 2021 14:30) (63 - 77)  BP: 126/69 (19 Nov 2021 14:30) (107/60 - 134/62)  BP(mean): 87 (19 Nov 2021 14:15) (83 - 97)  RR: 20 (19 Nov 2021 14:30) (18 - 20)  SpO2: 98% (19 Nov 2021 14:30) (97% - 100%)    Physical Exam:  Constitutional: AAOx3, NAD  Neck: supple, No JVD  Cardiovascular: +S1S2 RRR, no murmurs, rubs, gallops   Pulmonary: CTA b/l, unlabored, no wheezes, rales. No rhonchi  Abdomen: +BS, soft NTND  Extremities: no edema b/l,   Neuro: non focal, speech clear, MAZARIEGOS x 4    LABS:                        12.2   3.10  )-----------( 176      ( 19 Nov 2021 07:12 )             37.2    143  |  111<H>  |  12.5  ----------------------------<  93  4.5   |  23.0  |  0.60  Ca    8.1<L>      19 Nov 2021 07:12  Phos  3.5     11-19  Mg     1.9     11-19  TPro  6.1<L>  /  Alb  3.2<L>  /  TBili  0.3<L>  /  DBili  0.1  /  AST  42<H>  /  ALT  28  /  AlkPhos  46  11-19  LIVER FUNCTIONS - ( 19 Nov 2021 07:12 )  Alb: 3.2 g/dL / Pro: 6.1 g/dL / ALK PHOS: 46 U/L / ALT: 28 U/L / AST: 42 U/L / GGT: x         CARDIAC MARKERS ( 19 Nov 2021 07:12 )  x     / x     / 303 U/L / x     / 14.8 ng/mL    RADIOLOGY & ADDITIONAL STUDIES:  KENJI 11/19/21  Summary:   1. Technically good study.   2. Normal global left ventricular systolic function.   3. Left ventricular ejection fraction, by visual estimation, is 60 to 65%.   4. Left atrial enlargement.   5. Normal right atrial size.   6. Trace mitral valve regurgitation.   7. No left atrial appendage thrombus.   8. Mild tricuspid regurgitation.   9. Aortic root at the sinus of Valsalva and STJ is normal in size. The ascending aorta measured at 3 cm from the aortic valve annulus is mildly enlarged at 3.7 cm.  10. Mild pulmonic valve regurgitation.  11. Small PFO with right to left shunt with Valsalva maneuver.  12. There is no evidence of pericardial effusion.    MRI  IMPRESSION: Small acute/subacute medial left thalamic infarct.    TELE: SR with rates in the 60-70s; with episodes of AIVR    EKG: SR at 71bpm; QRSD 128ms; RBBB; AL: 212ms    A/P  61 year old male patient with a history of rheumatoid arthritis, polymyositis with baseline b/l upper extremity and RLE weakness, Raynaud's syndrome admitted with TIA and small acute/subacute medial left thalamic infarct    Telemetry with no Afib. Short episodes of AIVR. Baseline EKG with RBBB and first degree AV block.     - Loop recorder insertion is reasonable in this patient.  - Will perform prior to discharge today.

## 2021-11-19 NOTE — PROGRESS NOTE ADULT - SUBJECTIVE AND OBJECTIVE BOX
[place] [facility]                                                               Office: [address] [address]                                                              Telephone: [phone]. Fax:[phone]                                                                             PROGRESS NOTE  Reason for follow up: CVA  Overnight: Treated for hypoglycemia.   Update: For KENJI today    Subjective: " I feel fine"      Review of symptoms:   Cardiac:  No chest pain. No dyspnea. No palpitations.  Respiratory:no cough. No dyspnea  Gastrointestinal: No diarrhea. No abdominal pain. No bleeding.   Neuro: No focal neuro complaints.      Vitals:  T(C): 36.7 (21 @ 08:00), Max: 36.7 (21 @ 20:19)  HR: 77 (21 @ 08:00) (67 - 77)  BP: 120/71 (21 @ 08:00) (110/71 - 120/71)  RR: 18 (21 @ 08:00) (18 - 19)  SpO2: 98% (21 @ 08:00) (97% - 99%)  Wt(kg): --  I&O's Summary    2021 07:01  -  2021 07:00  --------------------------------------------------------  IN: 0 mL / OUT: 400 mL / NET: -400 mL      Weight (kg): 88.451 ( @ 12:00)    ASA and Mallampati as per anesthesia      PHYSICAL EXAM:  Appearance: Comfortable. No acute distress  HEENT:  Atraumatic. Normocephalic.  Normal oral mucosa, PERRL, Neck is supple. No carotid bruit.   Neurologic: A & O x 3, Speech fluent, RUE/RLE 4/5  Cardiovascular: Normal S1 S2, No murmur, rubs/gallops. No JVD  Respiratory: Lungs clear to auscultation, unlabored   Gastrointestinal:  Soft, Non-tender, + BS  Lower Extremities: No edema         CURRENT MEDICATIONS:    dextrose 40% Gel  dextrose 50% Injectable  dextrose 50% Injectable  dextrose 50% Injectable  insulin lispro (ADMELOG) corrective regimen sliding scale  aspirin  chewable  dextrose 5%.  dextrose 5%.  enoxaparin Injectable  influenza   Vaccine  magnesium oxide      DIAGNOSTIC TESTING:  [x ] Echocardiogram: < from: TTE Echo Complete w/ Contrast w/ Doppler (21 @ 17:26) >  EXAM:  ECHO TTE WITH CON COMP W DOPP      PROCEDURE DATE:  2021   .      INTERPRETATION:  TRANSTHORACIC ECHOCARDIOGRAM REPORT        Patient Name:   LESLI SCHROEDER Patient Location: Inpatient  Medical Rec #:  [id number]        Accession #:  [id number]  Account #:      [id number]        Height:           72.0 in 182.9 cm  YOB: 1960       Weight:           195.0 lb 88.45 kg  Patient Age:    61 years        BSA:              2.11 m²  Patient Gender: M               BP:       109/59 mmHg      Date of Exam:        2021 5:26:53 PM  Sonographer:         Gloria Gutierrez  Referring Physician: Shamir Gutierrez MD    Procedure:   2D Echo/Doppler/Color Doppler Complete.  Indications: Abnormal electrocardiogram [ECG] [EKG] - R94.31  Diagnosis:   Abnormal electrocardiogram [ECG] [EKG] - R94.31        2D AND M-MODE MEASUREMENTS (normal ranges within parentheses):  Left                 Normal   Aorta/Left            Normal  Ventricle:                    Atrium:  IVSd (2D):    1.09  (0.7-1.1) Left Atrium    2.94  (1.9-4.0)                 cm             (2D):           cm  LVPWd (2D):   1.09  (0.7-1.1) LA Volume      27.9                 cm             Index         ml/m²  LVIDd (2D):   3.55  (3.4-5.7) Right Ventricle:                 cm             TAPSE:           1.66 cm  LVIDs (2D):   1.87                 cm  LV FS (2D):   47.4   (>25%)                  %  Relative Wall 0.61   (<0.42)  Thickness    LV SYSTOLIC FUNCTION BY 2D PLANIMETRY (MOD):  EF-A4C View: 73.7 % EF-A2C View: 76.3 % EF-Biplane: 74 %    LV DIASTOLIC FUNCTION:  MV Peak E: 0.94 m/s e', MV Violette: 0.15 m/s  MV Peak A: 0.80 m/s E/e' Ratio: 6.44  E/A Ratio: 1.18    SPECTRAL DOPPLER ANALYSIS (where applicable):  Aortic Valve: AoV Max Brown: 1.04 m/s AoV Peak P.3 mmHg AoV Mean P.2 mmHg    LVOT Vmax: 1.08 m/s LVOT VTI: 0.209 m LVOT Diameter: 2.25 cm    AoV Area, Vmax: 4.13 cm² AoV Area, VTI: 4.63 cm² AoV Area, Vmn: 3.52 cm²  Ao VTI: 0.180  Tricuspid Valve and PA/RV Systolic Pressure: TR Max Velocity: 2.72 m/s RA Pressure: 3 mmHg RVSP/PASP: 32.7 mmHg      PHYSICIAN INTERPRETATION:  Left Ventricle: The left ventricular internal cavity size is normal. Left ventricular wall thickness is normal.  Global LV systolic function was normal. Left ventricular ejection fraction, by visual estimation, is 70 to 75%. Spectral Doppler shows normal pattern of LV diastolic filling.  Right Ventricle: Normal right ventricular size and function. The right ventricular size is normal. RV systolic function is normal.  Left Atrium: Normal left atrial size.  Right Atrium: Normal right atrial size.  Pericardium: There is no evidence of pericardial effusion.  Mitral Valve: Trace mitral valve regurgitation is seen.  Tricuspid Valve: The tricuspid valve is normal in structure. Mild tricuspid regurgitation is visualized.  Aortic Valve: The aortic valve is trileaflet. No evidence of aortic stenosis. Peak transaortic gradient equals 4.3 mmHg, mean transaortic gradient equals 2.2 mmHg, the calculated aortic valve area equals 4.63 cm² by the continuity equation consistent with normally opening aortic valve. No evidence of aortic valve regurgitation is seen.  Pulmonic Valve: No indication of pulmonic valve regurgitation.  Aorta: The aortic root and ascendingaorta are structurally normal, with no evidence of dilitation.  Pulmonary Artery: The main pulmonary artery is normal in size.  Venous: The inferior vena cava is 1.7 cm. The inferior vena cava was normal sized, with respiratory size variation greaterthan 50%.  In comparison to the previous echocardiogram(s): There are no prior studies on this patient for comparison purposes.      Summary:   1. Left ventricular ejection fraction, by visual estimation, is 70 to 75%.   2. Normal global left ventricular systolic function.   3. Diastolic function is normal.   4. Normal right ventricular size and function, estimated PASP 33 mmHg.   5. Trace mitral valve regurgitation.   6. Mild tricuspid regurgitation.   7. There is no evidence of pericardial effusion.    < end of copied text >    LABS:	 	  CARDIAC MARKERS ( 2021 07:12 )  x     / x     / 303 U/L / x     / 14.8 ng/mL  p-BNP 2021 07:12: x                              12.2   3.10  )-----------( 176      ( 2021 07:12 )             37.2         143  |  111<H>  |  12.5  ----------------------------<  93  4.5   |  23.0  |  0.60    Ca    8.1<L>      2021 07:12  Phos  3.5       Mg     1.9         TPro  6.1<L>  /  Alb  3.2<L>  /  TBili  0.3<L>  /  DBili  0.1  /  AST  42<H>  /  ALT  28  /  AlkPhos  46      proBNP:   Lipid Profile: Date:  @ 04:07  Total cholesterol 122; Direct LDL: --; HDL: 39; Triglycerides:65    HgA1c:   TSH: Thyroid Stimulating Hormone, Serum: 1.07 uIU/mL        TELEMETRY: Reviewed    ECG:  Reviewed by me.

## 2021-11-19 NOTE — PROGRESS NOTE ADULT - SUBJECTIVE AND OBJECTIVE BOX
Patient is a 61y old  Male who presented with a chief complaint of Acute onset hemiparesis and difficulty speaking (17 Nov 2021 12:53)    HPI:  61M PMH rheumatoid arthritis, polymyositis with baseline b/l upper extremity and RLE weakness, Raynaud's syndrome, only on holistic medication, presented to [facility] after acute onset altered mental status earlier this AM, last known well 21:30 last night when he went to bed. Per daughter and patient, he awoke ~ 05:30 AM and was able to walk down the stairs, but while he was sitting on the couch he was noted to be nonverbal, have a left facial droop, and have difficulty moving his right side. On arrival to [facility], NIH 20, CT/CTA/CTP without acute findings. MRI/MRA brain also obtained without acute findings. 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 Patient transferred yesterday from [place] for concern for basilar occlusion/stroke with resolution of symptoms on arrival. Patient admitted to NSICU for monitoring 2/2 possible basilar symptoms. Patient has remained intact, no acute events reported by staff.   11-19-21  No new neurological symptoms were reported. Remains neurologically stable.      Vital Signs Last 24 Hrs  T(C): 36.8 (19 Nov 2021 12:40), Max: 36.8 (19 Nov 2021 12:40)  T(F): 98.3 (19 Nov 2021 12:40), Max: 98.3 (19 Nov 2021 12:40)  HR: 66 (19 Nov 2021 17:29) (63 - 77)  BP: 118/59 (19 Nov 2021 17:29) (107/60 - 134/62)  BP(mean): 87 (19 Nov 2021 14:15) (83 - 97)  RR: 20 (19 Nov 2021 17:29) (18 - 20)  SpO2: 97% (19 Nov 2021 17:29) (97% - 100%)        Physical Exam:  Constitutional: NAD, lying in bed   Neuro  	* Mental Status:  GCS 15: E4, V5, M6. Awake, alert, oriented to conversation. No aphasia & word finding difficulty. Able to name objects & their function.   	* Cranial Nerves: Cnii-Cnxii grossly intact. PERRL, EOMI, tongue midline, no gaze deviation.   	* Motor: b/u UE 5/5 except triceps 4-/5 (baseline), b/l LE 4/5  	* Sensory: Sensation intact to light touch  	* Reflexes: Not assessed  	* Gait: Not assessed      LABS:    CBC Full  -  ( 19 Nov 2021 07:12 )  WBC Count : 3.10 K/uL  RBC Count : 4.06 M/uL  Hemoglobin : 12.2 g/dL  Hematocrit : 37.2 %  Platelet Count - Automated : 176 K/uL  Mean Cell Volume : 91.6 fl  Mean Cell Hemoglobin : 30.0 pg  Mean Cell Hemoglobin Concentration : 32.8 gm/dL  Auto Neutrophil # : x  Auto Lymphocyte # : x  Auto Monocyte # : x  Auto Eosinophil # : x  Auto Basophil # : x  Auto Neutrophil % : x  Auto Lymphocyte % : x  Auto Monocyte % : x  Auto Eosinophil % : x  Auto Basophil % : x      11-19    143  |  111<H>  |  12.5  ----------------------------<  93  4.5   |  23.0  |  0.60    Ca    8.1<L>      19 Nov 2021 07:12  Phos  3.5     11-19  Mg     1.9     11-19    TPro  6.1<L>  /  Alb  3.2<L>  /  TBili  0.3<L>  /  DBili  0.1  /  AST  42<H>  /  ALT  28  /  AlkPhos  46  11-19    LIVER FUNCTIONS - ( 19 Nov 2021 07:12 )  Alb: 3.2 g/dL / Pro: 6.1 g/dL / ALK PHOS: 46 U/L / ALT: 28 U/L / AST: 42 U/L / GGT: x             11-18    142  |  111<H>  |  8.7  ----------------------------<  88  4.2   |  21.0<L>  |  0.53    Ca    8.2<L>      18 Nov 2021 04:07  Phos  2.8     11-18  Mg     1.9     11-18        Hemoglobin A1C:   Lipid Panel 11-18 @ 04:07  Total Cholesterol, Serum 122  LDL --  Triglycerides 65    Medications:  MEDICATIONS  (STANDING):  aspirin  chewable 81 milliGRAM(s) Oral daily  ceFAZolin   IVPB 2000 milliGRAM(s) IV Intermittent once  dextrose 40% Gel 15 Gram(s) Oral once  dextrose 5%. 1000 milliLiter(s) (50 mL/Hr) IV Continuous <Continuous>  dextrose 5%. 1000 milliLiter(s) (100 mL/Hr) IV Continuous <Continuous>  dextrose 50% Injectable 25 Gram(s) IV Push once  dextrose 50% Injectable 12.5 Gram(s) IV Push once  dextrose 50% Injectable 25 Gram(s) IV Push once  enoxaparin Injectable 40 milliGRAM(s) SubCutaneous <User Schedule>  influenza   Vaccine 0.5 milliLiter(s) IntraMuscular once  insulin lispro (ADMELOG) corrective regimen sliding scale   SubCutaneous three times a day before meals  magnesium oxide 400 milliGRAM(s) Oral two times a day with meals        RADIOLOGY & ADDITIONAL STUDIES:  CTH: No acute intracranial bleeding, mass effect, or shift.    CT PERFUSION: No regional perfusion abnormality.    CTA BRAIN: Patent intracranial circulation. No flow-limiting stenosis or occlusion.    CTA NECK: Patent cervical vasculature. No flow limiting stenosis or occlusion.    Incidentally noted is soft tissue asymmetry along the left tonsillar region  as described above, correlate clinically with visual inspection.      MRI:    MRI BRAIN:  No hydrocephalus, mass effect, acute intracranial hemorrhage, vasogenic edema, or acute territorial infarct.    Signal abnormality in the mesial left thalamus on diffusion-weighted images is felt to represent artifact.    MRA BRAIN:  No vascular aneurysm or flow-limiting stenosis.    MRA NECK:  flow-limiting stenosis or vascular aneurysm.  No evidence for arterial dissection.      MRI Brain Repeat 11-  MR Head No Cont (11.19.21 @ 01:31) >  IMPRESSION: Small acute/subacute medial left thalamic infarct.          TTE 11/17/21    Summary:   1. Left ventricular ejection fraction, by visual estimation, is 70 to 75%.   2. Normal global left ventricular systolic function.   3. Diastolic function is normal.   4. Normal right ventricular size and function, estimated PASP 33 mmHg.   5. Trace mitral valve regurgitation.   6. Mild tricuspid regurgitation.   7. There is no evidence of pericardial effusion.    Alverto Bianchi DO Electronically signed on 11/17/2021 at 6:29:49 PM           KENJI Echo Doppler (11.19.21 @ 13:14) >    Summary:   1. Technically good study.   2. Normal global left ventricular systolic function.   3. Left ventricular ejection fraction, by visual estimation, is 60 to 65%.   4. Left atrial enlargement.   5. Normal right atrial size.   6. Trace mitral valve regurgitation.   7. No left atrial appendage thrombus.   8. Mild tricuspid regurgitation.   9. Aortic root at the sinus of Valsalva and STJ is normal in size. The ascending aorta measured at 3 cm from the aortic valve annulus is mildly enlarged at 3.7 cm.  10. Mild pulmonic valve regurgitation.  11. Small PFO with right to left shunt with Valsalva maneuver.  12. There is no evidence of pericardial effusion.    MD Denise Electronically signed on 11/19/2021 at 2:12:10 PM    < end of copied text >

## 2021-11-19 NOTE — DISCHARGE NOTE PROVIDER - PROVIDER TOKENS
PROVIDER:[TOKEN:[[id number]],FOLLOWUP:[2 weeks],ESTABLISHEDPATIENT:[T]] PROVIDER:[TOKEN:[[id number]],FOLLOWUP:[2 weeks],ESTABLISHEDPATIENT:[T]],PROVIDER:[TOKEN:[[id number]]],PROVIDER:[TOKEN:[[id number]]]

## 2021-11-19 NOTE — DISCHARGE NOTE PROVIDER - CARE PROVIDER_API CALL
Wang Cutler)  Cardiovascular Disease; Internal Medicine  [address] [place] [address]  Phone: [phone]  Fax: [phone]  Established Patient  Follow Up Time: 2 weeks   Wang Cutler)  Cardiovascular Disease; Internal Medicine  [address] [place] [address]  Phone: [phone]  Fax: [phone]  Established Patient  Follow Up Time: 2 weeks    Karan Ibarra)  EEGEpilepsy; Neurology; Neuromuscular Medicine; Sleep Medicine; Vascular Neurology  [address] [place] [address]  Phone: [phone]  Fax: [phone]  Follow Up Time:     Josh Hayden)  Cardiovascular Disease  [address] [facility] [address]  [place] [address]  Phone: [phone]  Fax: [phone]  Follow Up Time:

## 2021-11-19 NOTE — DISCHARGE NOTE PROVIDER - HOSPITAL COURSE
61M PMH rheumatoid arthritis, polymyositis with baseline b/l upper extremity and RLE weakness, Raynaud's syndrome, only on holistic medication, presented to [facility] after acute onset altered mental status most likely secondary to left thalamic infarct which was confirmed on MRI. Patient underwent a KENJI with a small PFO with right to left shunt which is too small to require closure per cardiology. Patient also underwent ILR placement for afib monitoring. Prior to discharge a venous duplex was completed which was negative for a DVT. Patient will be discharged home on aspirin and lipitor and require follow up with cardiology and neurology upon discharge.    Patient is medically stable for discharge. Time spent on discharge 40 minutes.

## 2021-11-19 NOTE — PROGRESS NOTE ADULT - ASSESSMENT
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    R/O Cardioembolic Stroke   -CTA Brain/Neck/ CT Perfusion- negative, patent intracranial circulation  -Patient states all presenting symptoms resolved at time of assessment; currently asymptomatic hypoglycemia  -Cont. to monitor  -Plan for KENJI  -Confirmed NPO after MN  -If KENJI negative, plan for ILR placement by discharge

## 2021-11-19 NOTE — DISCHARGE NOTE PROVIDER - CARE PROVIDERS DIRECT ADDRESSES
,DirectAddress_Unknown ,DirectAddress_Unknown,[email or web address]

## 2021-11-19 NOTE — PROGRESS NOTE ADULT - ATTENDING COMMENTS
Pt is seen, examined, chart reviewed, d/w np/pa.  Management as outlined above.  R/O Cardioembolic Stroke   CTA Brain/Neck/ CT Perfusion- negative, patent intracranial circulation  Plan for KENJI. Confirmed NPO after MN. If KENJI negative, plan for ILR placement by discharge
Patient was seen and examined. History and exam as documented above by PA was confirmed.  Patient remains neurologically intact for some proximal muscle weakness in B/L ue and R LE ( hip flexors).IMP   Posterior circulation TIA  Mechanism ESUS ( Embolic Stroke of Unknown Source)  Plan    - Repeat MRI brain stroke protocol  _ Continue ASA 81 QD  - No statin given history of Polymyositis.  Telemetry No afib reported.  - WIll check CPK in am  - Cardiology consult for KENJI and ILR  - Dispo planning possible d/c home tomorrow.

## 2021-11-19 NOTE — PROGRESS NOTE ADULT - SUBJECTIVE AND OBJECTIVE BOX
Department of Cardiology                                                                  [facility]/[place] [address] [address] [facility]                                                            Telephone: [phone]. 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  S/P KENJI which was +small PFO  Pt tolerated procedure well    MEDICATIONS  (STANDING):  aspirin  chewable 81 milliGRAM(s) Oral daily  dextrose 40% Gel 15 Gram(s) Oral once  dextrose 5%. 1000 milliLiter(s) (50 mL/Hr) IV Continuous <Continuous>  dextrose 5%. 1000 milliLiter(s) (100 mL/Hr) IV Continuous <Continuous>  dextrose 50% Injectable 25 Gram(s) IV Push once  dextrose 50% Injectable 12.5 Gram(s) IV Push once  dextrose 50% Injectable 25 Gram(s) IV Push once  dextrose 50% Injectable 25 Gram(s) IV Push once  enoxaparin Injectable 40 milliGRAM(s) SubCutaneous <User Schedule>  influenza   Vaccine 0.5 milliLiter(s) IntraMuscular once  insulin lispro (ADMELOG) corrective regimen sliding scale   SubCutaneous three times a day before meals  magnesium oxide 400 milliGRAM(s) Oral two times a day with meals    MEDICATIONS  (PRN):    T(C): 36.8 (11-19-21 @ 12:40), Max: 36.8 (11-19-21 @ 12:40)  HR: 69 (11-19-21 @ 13:45) (67 - 77)  BP: 109/64 (11-19-21 @ 13:45) (107/60 - 134/62)  RR: 19 (11-19-21 @ 13:45) (18 - 19)  SpO2: 99% (11-19-21 @ 13:45) (97% - 99%)  Wt(kg): --    I&O's Summary    18 Nov 2021 07:01  -  19 Nov 2021 07:00  --------------------------------------------------------  IN: 0 mL / OUT: 400 mL / NET: -400 mL      Constitutional: A & O x 3  Cardiovascular: Normal S1 S2, No JVD, No murmurs   Respiratory: Lungs clear to auscultation	  Gastrointestinal:  Soft, Non-tender, + BS	  Extremities: RUE/RLE 4/5 No clubbing, cyanosis or edema  Vascular: Peripheral pulses palpable 2+ bilaterally    ASSESSMENT: 62 y/o M with CVA S/P KENJI with small PFO    -Post KENJI orders  -preliminary verbal report; centricity pending  -Management as per hospitalist

## 2021-11-19 NOTE — PROGRESS NOTE ADULT - ASSESSMENT
61M PMH rheumatoid arthritis, polymyositis with baseline b/l upper extremity and RLE weakness, Raynaud's syndrome, only on holistic medication, presented to [facility] after acute onset altered mental status most likely secondary to left thalamic infarct.     Problem/Plan - 1:  ·  Problem: Left Thalamic Infarct.   ·  Plan: NIHSS 0; no residual affects   Initial MRI of brain negative but concerns for possible artifact  Repeat MRI with confirmation of left thalamic infarct  KENJI with small PFO with right to left shunt  s/p ILR placement  HbA1c 5.0; lipid panel reviewed LDL 70  Neuro recommending venous duplex prior to discharge  Continue Neurochecks q 4 hours  Continue ASA and hold Lipitor due to history of polymyositis per Dr. Ibarra  PT - home PT     Problem/Plan - 2:  ·  Problem: Rheumatoid arthritis.   ·  Plan: not on any medications  Outpatient follow up with Rheumatology     Problem/Plan - 3:  ·  Problem: Raynaud disease.   ·  Plan: outpatient follow up     Problem/Plan - 4:  ·  Problem: History of myositis.   ·  Plan: outpatient follow up     Problem/Plan - 5:  ·  Problem: Hypoglycemia  Plan: due to NPO status  -s/p D50 x 2  -started on D5 while NPO  -monitor accuchecks overnight    DVT ppx - Lovenox SC    Dispo - Neuro recommending venous duplex prior to discharge. Discharge in AM if venous duplex is negative. PT- home PT.    Plan discussed with patient, Dr. Campuzano, EP PA, medicine PA, Dr. Ibarra, RN, CCM

## 2021-11-19 NOTE — CONSULT NOTE ADULT - ATTENDING COMMENTS
Patient with cryptogenic ischemic stroke. ILR implant for long-term afib monitoring.
Pt is seen, examined, chart reviewed, d/w np/pa.  Management as outlined above.  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  R/O Cardioembolic Stroke: CTA Brain/Neck/ CT Perfusion- negative, patent intracranial circulation. Patient states all presenting symptoms resolved at time of assessment. Plan for KENJI in AM, maintain NPO after MN.  If KENJI negative, plan for ILR placement by discharge

## 2021-11-19 NOTE — CONSULT NOTE ADULT - REASON FOR ADMISSION
Acute onset hemiparesis and difficulty speaking
Acute onset hemiparesis and difficulty speaking
R/O stroke

## 2021-11-19 NOTE — DISCHARGE NOTE PROVIDER - NSDCCPCAREPLAN_GEN_ALL_CORE_FT
PRINCIPAL DISCHARGE DIAGNOSIS  Diagnosis: Thalamic infarct, acute  Assessment and Plan of Treatment: continue aspirin and eventually start statin   please follow up with cardiology within 2-3 weeks to discuss if PFO closure is indicated      SECONDARY DISCHARGE DIAGNOSES  Diagnosis: Rheumatoid arthritis  Assessment and Plan of Treatment: outpatient follow up with rheumatology and PCP

## 2021-11-19 NOTE — CHART NOTE - NSCHARTNOTEFT_GEN_A_CORE
HPI: 61M PMH rheumatoid arthritis, polymyositis with baseline b/l upper extremity and RLE weakness, Raynaud's syndrome, only on holistic medication, presented to [facility] after acute onset altered mental status earlier this AM, last known well 21:30 last night when he went to bed. Per daughter and patient, he awoke ~ 05:30 AM and was able to walk down the stairs, but while he was sitting on the couch he was noted to be nonverbal, have a left facial droop, and have difficulty moving his right side. On arrival to [facility], NIH 20, CT/CTA/CTP without acute findings. MRI/MRA brain also obtained without acute findings. 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    Admission NIH 0  Admission mRS 0 (17 Nov 2021 12:53)      INTERVAL HPI/OVERNIGHT EVENTS: No acute overnight events. Patient stable with return to baseline.       Vital Signs Last 24 Hrs  T(C): 36.7 (18 Nov 2021 07:28), Max: 37 (17 Nov 2021 16:00)  T(F): 98.1 (18 Nov 2021 07:28), Max: 98.6 (17 Nov 2021 16:00)  HR: 66 (18 Nov 2021 09:00) (57 - 78)  BP: 100/89 (18 Nov 2021 09:00) (90/59 - 111/73)  BP(mean): 94 (18 Nov 2021 09:00) (67 - 94)  RR: 16 (18 Nov 2021 09:00) (11 - 23)  SpO2: 99% (18 Nov 2021 09:00) (97% - 100%)      PHYSICAL EXAM:  GENERAL: NAD, well-groomed, well-developed  HEAD:  Atraumatic, normocephalic  DAVID COMA SCORE: E4 V5 M6 =15  MENTAL STATUS: AAO x3; Awake, opens eyes spontaneously. Appropriately conversant without aphasia, following simple commands,  CRANIAL NERVES: Visual acuity normal for age, visual fields full to confrontation, PERRL. EOMI without nystagmus. Facial sensation intact. Face symmetric w/ normal eye closure and smile, tongue midline. Hearing grossly intact. Speech clear. Head turning and shoulder shrug intact.   MOTOR: UE 5/5 b/l except RUE tricep 4-/5, RLE 4-/5, LLE 5/5 (baseline)  SENSATION: grossly intact to light touch all extremities  COORDINATION: Rapid alternating movements intact      LABS:                        11.5   3.26  )-----------( 170      ( 18 Nov 2021 04:07 )             36.0     11-18    142  |  111<H>  |  8.7  ----------------------------<  88  4.2   |  21.0<L>  |  0.53    Ca    8.2<L>      18 Nov 2021 04:07  Phos  2.8     11-18  Mg     1.9     11-18 11-17 @ 07:01  -  11-18 @ 07:00  --------------------------------------------------------  IN: 1700 mL / OUT: 2025 mL / NET: -325 mL    11-18 @ 07:01  -  11-18 @ 09:07  --------------------------------------------------------  IN: 0 mL / OUT: 400 mL / NET: -400 mL        RADIOLOGY & ADDITIONAL TESTS:  CT/CTA/CTP: No LVO  MRI: negative  TTE: EF 70-75% EF, trace MR, mild TR      CAPRINI SCORE [CLOT]:  Patient has an estimated Caprini score of greater than 5.  However, the patient's unique clinical situation will be addressed in an individual manner to determine appropriate anticoagulation treatment, if any.      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 All imaging negative. Patient with complete resolution of symptoms and back to baseline. No longer requiring ICU level of care.      PLAN    Neuro:                Case d/w team, patient stable, DG to [facility] floor under medicine service as primary                 Neurology following                Stroke core measures evaluated   	Q4 hour Neuro checks, Q4 hour Vitals  	Maintain normothermia, PO acetaminophen for temp>38 C or pain  	CTH if any acute change in mental status  	EEG   	Activity ad nora, with assistance, PT/OT ordered  	    CV:  	SBP Goal 100-160  	TTE      Pulm:  	Supplemental O2 PRN to maintain Spo2>92%    GI:  	Nutrition: DASH diet  	    Gu:  	Voiding  	Monitor I&O   	Monitor Electrolytes & Renal Function      Heme:  	Monitor H&H  	Chemical DVT prophylaxis: Lovenox 40                 ASA 81	                Mechanical DVT Prophylaxis: Maintain B/L LE sequential compression devices                   ID:  	Monitor WBC and Temperature      Endo  	Monitor BGL, maintain <180
Patient s/p successful ILR insertion without complication  Loop Recorder Incision Care:     - Remove the plastic and gauze dressing after 24 hours.   - Do not touch the incision until it is completely healed.   - There is Dermabond (skin glue) on your incision, which will start to flake off on its own over the next 2-3 weeks. Do not pick at or peal off the Dermabond.   - Do not apply soaps, creams, lotions, ointments or powders to the incision until it is completely healed.  - You should call the doctor if you notice redness, drainage, swelling, increased tenderness, hot sensation around the  incision, bleeding or incision edges pulling apart.
Informed by RN in passing patient's tele strip from this morning is "inaccurate" as patient only had one lead on and all others were disconnected.  RN reports concern for possible vtach based on tele strip with one lead on.  Advised RN this tele strip is inaccurate, as all the leads were not placed on the patient.  Dr. Palacios aware and in agreement if all the leads are not on -tele strip is inaccurate.  Patient completely asymptomatic, VSS.   Patient denies: chest pain, palpitations, lightheadedness, dizziness, headache, limb numbness / weakness / paresis, abnormal changes in vision or speech, difficulty breathing or SOB.     Vital Signs Last 24 Hrs  T(C): 36.8 (19 Nov 2021 12:40), Max: 36.8 (19 Nov 2021 12:40)  T(F): 98.3 (19 Nov 2021 12:40), Max: 98.3 (19 Nov 2021 12:40)  HR: 66 (19 Nov 2021 17:29) (63 - 77)  BP: 118/59 (19 Nov 2021 17:29) (107/60 - 134/62)  BP(mean): 87 (19 Nov 2021 14:15) (83 - 97)  RR: 20 (19 Nov 2021 17:29) (18 - 20)  SpO2: 97% (19 Nov 2021 17:29) (97% - 100%)    Physical Exam:  General: NAD, NCAT, resting comfortably in chair  Lungs: CTA B/L, no abnormal breath sounds appreciated  Cardio: normal S1, S2, no MRG appreciated  Abodmen: soft, non-tender, +BS  Extremities: no pedal edema appreciated   Neuro: AOx4, non-focal     Assessment: inaccurate tele strip secondary to incorrect lead placement and missing leads.  Reviewed tele monitor with RN with all leads placed appropriately - patient in NSR.      Plan:  -EKG performed -reviewed by attending  -labs stable, lytes replaced PRN  -no tele events seen with all leads appropriately placed   -continue tele  -cardio following  -KENJI performed: EF 60-65%, mild MV regurg, Mild PV regurg, small PFO with right to left shunt. No evidence of pericardial effusion.   -ILR placed   -am labs ordered   -RN to call provider with any acute changes.    -RN to ensure all leads are on and appropriately placed       The above patient and plan has been discussed at length with Dr. Palacios.
Nutrition Note: Aware pt downgraded from ICU. Chart reviewed; RD to follow up with full nutrition assessment per medical floor protocol.

## 2021-11-19 NOTE — PROGRESS NOTE ADULT - ASSESSMENT
Assessment:   61 M with PMH RA, polymyositis, who presented to [facility] with AMS, diplopia, and generalized weakness. Concern for basilar stroke, stat MRI performed in which symptoms resolved. Patient transferred to [facility] for workup. Patient remains at baseline neurologic status.       Plan:   - Q4 hour neuro checks  - SBP goal keep normtensive.  - Aspirin 81 by the mouth QD  - Stroke core measures:  TTE performed as above, MRI performed as above   LDL/A1C  LDL Cholesterol Calculated: 70 mg/dL (11.18.21 @ 04:07)   A1C with Estimated Average Glucose Result: 5.0 % (11.18.21 @ 04:07) ,  - DVT prophylaxis: SCDs, lovenox  - Repeat MR brain confirms a left thalamic stroke.  - Continue ASA 81, hold on statin for now with history of polymyositis  - Cardiology consulted for KENJI and ILR.   - Need Cardiology input regarding PFO closure. His ROPE score is 4 with a 38% probability that the stroke is related to the PFO.

## 2021-11-19 NOTE — PROGRESS NOTE ADULT - SUBJECTIVE AND OBJECTIVE BOX
CHIEF COMPLAINT/INTERVAL HISTORY:    Patient is a 61y old  Male who presents with a chief complaint of Acute onset hemiparesis and difficulty speaking (19 Nov 2021 17:52)    SUBJECTIVE & OBJECTIVE: Pt seen and examined at bedside. No overnight events. Patient OOB to chair; reports feeling well. No focal deficits. Repeat MRI with acute left thalamic infarct. TTE with small PFO. ILR placed.     ROS: No chest pain, palpitations, SOB, light headedness, dizziness, headache, nausea/vomiting, fevers/chills, abdominal pain, dysuria or increased urinary frequency.    ICU Vital Signs Last 24 Hrs  T(C): 36.8 (19 Nov 2021 13:30), Max: 36.8 (19 Nov 2021 12:40)  T(F): 98.2 (19 Nov 2021 13:30), Max: 98.3 (19 Nov 2021 12:40)  HR: 66 (19 Nov 2021 17:29) (63 - 77)  BP: 118/59 (19 Nov 2021 17:29) (107/60 - 134/65)  BP(mean): 87 (19 Nov 2021 14:15) (83 - 97)  RR: 20 (19 Nov 2021 17:29) (18 - 20)  SpO2: 97% (19 Nov 2021 17:29) (97% - 100%)    MEDICATIONS  (STANDING):  aspirin  chewable 81 milliGRAM(s) Oral daily  ceFAZolin   IVPB 2000 milliGRAM(s) IV Intermittent once  dextrose 40% Gel 15 Gram(s) Oral once  dextrose 5%. 1000 milliLiter(s) (50 mL/Hr) IV Continuous <Continuous>  dextrose 5%. 1000 milliLiter(s) (100 mL/Hr) IV Continuous <Continuous>  dextrose 50% Injectable 25 Gram(s) IV Push once  dextrose 50% Injectable 12.5 Gram(s) IV Push once  dextrose 50% Injectable 25 Gram(s) IV Push once  enoxaparin Injectable 40 milliGRAM(s) SubCutaneous <User Schedule>  influenza   Vaccine 0.5 milliLiter(s) IntraMuscular once  insulin lispro (ADMELOG) corrective regimen sliding scale   SubCutaneous three times a day before meals  magnesium oxide 400 milliGRAM(s) Oral two times a day with meals    MEDICATIONS  (PRN):      LABS:                        12.2   3.10  )-----------( 176      ( 19 Nov 2021 07:12 )             37.2     11-19    143  |  111<H>  |  12.5  ----------------------------<  93  4.5   |  23.0  |  0.60    Ca    8.1<L>      19 Nov 2021 07:12  Phos  3.5     11-19  Mg     1.9     11-19    TPro  6.1<L>  /  Alb  3.2<L>  /  TBili  0.3<L>  /  DBili  0.1  /  AST  42<H>  /  ALT  28  /  AlkPhos  46  11-19      CAPILLARY BLOOD GLUCOSE      POCT Blood Glucose.: 73 mg/dL (19 Nov 2021 17:54)  POCT Blood Glucose.: 72 mg/dL (19 Nov 2021 15:10)  POCT Blood Glucose.: 60 mg/dL (19 Nov 2021 14:40)  POCT Blood Glucose.: 45 mg/dL (19 Nov 2021 14:39)  POCT Blood Glucose.: 68 mg/dL (19 Nov 2021 14:24)  POCT Blood Glucose.: 70 mg/dL (19 Nov 2021 14:03)  POCT Blood Glucose.: 87 mg/dL (19 Nov 2021 12:46)  POCT Blood Glucose.: 64 mg/dL (19 Nov 2021 12:25)  POCT Blood Glucose.: 69 mg/dL (19 Nov 2021 12:12)  POCT Blood Glucose.: 60 mg/dL (19 Nov 2021 11:43)  POCT Blood Glucose.: 60 mg/dL (19 Nov 2021 11:41)  POCT Blood Glucose.: 70 mg/dL (19 Nov 2021 08:35)  POCT Blood Glucose.: 118 mg/dL (18 Nov 2021 21:09)    PHYSICAL EXAM:    GENERAL: elderly male, sitting in chair, NAD  HEAD:  Atraumatic, Normocephalic  EYES: EOMI, PERRLA, conjunctiva and sclera clear  ENMT: Moist mucous membranes  NECK: Supple   NERVOUS SYSTEM:  Alert & Oriented X3   CHEST/LUNG: Clear to auscultation bilaterally; No rales, rhonchi, wheezing, or rubs  HEART: Regular rate and rhythm; + S1/S2  ABDOMEN: Soft, Nontender, Nondistended; Bowel sounds present  EXTREMITIES:  no pedal edema

## 2021-11-20 VITALS
TEMPERATURE: 98 F | OXYGEN SATURATION: 98 % | DIASTOLIC BLOOD PRESSURE: 70 MMHG | SYSTOLIC BLOOD PRESSURE: 116 MMHG | HEART RATE: 84 BPM | RESPIRATION RATE: 18 BRPM

## 2021-11-20 LAB
ANION GAP SERPL CALC-SCNC: 12 MMOL/L — SIGNIFICANT CHANGE UP (ref 5–17)
BUN SERPL-MCNC: 13 MG/DL — SIGNIFICANT CHANGE UP (ref 8–20)
CALCIUM SERPL-MCNC: 9 MG/DL — SIGNIFICANT CHANGE UP (ref 8.6–10.2)
CHLORIDE SERPL-SCNC: 105 MMOL/L — SIGNIFICANT CHANGE UP (ref 98–107)
CO2 SERPL-SCNC: 23 MMOL/L — SIGNIFICANT CHANGE UP (ref 22–29)
COVID-19 SPIKE DOMAIN ANTIBODY RESULT: 0.41 U/ML — SIGNIFICANT CHANGE UP
CREAT SERPL-MCNC: 0.6 MG/DL — SIGNIFICANT CHANGE UP (ref 0.5–1.3)
GLUCOSE BLDC GLUCOMTR-MCNC: 63 MG/DL — LOW (ref 70–99)
GLUCOSE BLDC GLUCOMTR-MCNC: 68 MG/DL — LOW (ref 70–99)
GLUCOSE BLDC GLUCOMTR-MCNC: 78 MG/DL — SIGNIFICANT CHANGE UP (ref 70–99)
GLUCOSE SERPL-MCNC: 83 MG/DL — SIGNIFICANT CHANGE UP (ref 70–99)
HCT VFR BLD CALC: 42.3 % — SIGNIFICANT CHANGE UP (ref 39–50)
HGB BLD-MCNC: 13.4 G/DL — SIGNIFICANT CHANGE UP (ref 13–17)
MAGNESIUM SERPL-MCNC: 2.1 MG/DL — SIGNIFICANT CHANGE UP (ref 1.6–2.6)
MCHC RBC-ENTMCNC: 29.6 PG — SIGNIFICANT CHANGE UP (ref 27–34)
MCHC RBC-ENTMCNC: 31.7 GM/DL — LOW (ref 32–36)
MCV RBC AUTO: 93.4 FL — SIGNIFICANT CHANGE UP (ref 80–100)
PHOSPHATE SERPL-MCNC: 2.7 MG/DL — SIGNIFICANT CHANGE UP (ref 2.4–4.7)
PLATELET # BLD AUTO: 224 K/UL — SIGNIFICANT CHANGE UP (ref 150–400)
POTASSIUM SERPL-MCNC: 4.9 MMOL/L — SIGNIFICANT CHANGE UP (ref 3.5–5.3)
POTASSIUM SERPL-SCNC: 4.9 MMOL/L — SIGNIFICANT CHANGE UP (ref 3.5–5.3)
RBC # BLD: 4.53 M/UL — SIGNIFICANT CHANGE UP (ref 4.2–5.8)
RBC # FLD: 14.7 % — HIGH (ref 10.3–14.5)
SARS-COV-2 IGG+IGM SERPL QL IA: 0.41 U/ML — SIGNIFICANT CHANGE UP
SODIUM SERPL-SCNC: 140 MMOL/L — SIGNIFICANT CHANGE UP (ref 135–145)
WBC # BLD: 4.34 K/UL — SIGNIFICANT CHANGE UP (ref 3.8–10.5)
WBC # FLD AUTO: 4.34 K/UL — SIGNIFICANT CHANGE UP (ref 3.8–10.5)

## 2021-11-20 PROCEDURE — 93970 EXTREMITY STUDY: CPT

## 2021-11-20 PROCEDURE — 82550 ASSAY OF CK (CPK): CPT

## 2021-11-20 PROCEDURE — 85027 COMPLETE CBC AUTOMATED: CPT

## 2021-11-20 PROCEDURE — U0005: CPT

## 2021-11-20 PROCEDURE — 36415 COLL VENOUS BLD VENIPUNCTURE: CPT

## 2021-11-20 PROCEDURE — 95700 EEG CONT REC W/VID EEG TECH: CPT

## 2021-11-20 PROCEDURE — 93970 EXTREMITY STUDY: CPT | Mod: 26

## 2021-11-20 PROCEDURE — 86803 HEPATITIS C AB TEST: CPT

## 2021-11-20 PROCEDURE — 83735 ASSAY OF MAGNESIUM: CPT

## 2021-11-20 PROCEDURE — 84100 ASSAY OF PHOSPHORUS: CPT

## 2021-11-20 PROCEDURE — 97116 GAIT TRAINING THERAPY: CPT

## 2021-11-20 PROCEDURE — 70551 MRI BRAIN STEM W/O DYE: CPT

## 2021-11-20 PROCEDURE — 80061 LIPID PANEL: CPT

## 2021-11-20 PROCEDURE — U0003: CPT

## 2021-11-20 PROCEDURE — 80076 HEPATIC FUNCTION PANEL: CPT

## 2021-11-20 PROCEDURE — 93005 ELECTROCARDIOGRAM TRACING: CPT

## 2021-11-20 PROCEDURE — 93312 ECHO TRANSESOPHAGEAL: CPT

## 2021-11-20 PROCEDURE — 97535 SELF CARE MNGMENT TRAINING: CPT

## 2021-11-20 PROCEDURE — 86769 SARS-COV-2 COVID-19 ANTIBODY: CPT

## 2021-11-20 PROCEDURE — C1764: CPT

## 2021-11-20 PROCEDURE — 99239 HOSP IP/OBS DSCHRG MGMT >30: CPT

## 2021-11-20 PROCEDURE — 83036 HEMOGLOBIN GLYCOSYLATED A1C: CPT

## 2021-11-20 PROCEDURE — 95714 VEEG EA 12-26 HR UNMNTR: CPT

## 2021-11-20 PROCEDURE — 80048 BASIC METABOLIC PNL TOTAL CA: CPT

## 2021-11-20 PROCEDURE — 93325 DOPPLER ECHO COLOR FLOW MAPG: CPT

## 2021-11-20 PROCEDURE — 82962 GLUCOSE BLOOD TEST: CPT

## 2021-11-20 PROCEDURE — 97530 THERAPEUTIC ACTIVITIES: CPT

## 2021-11-20 PROCEDURE — 84443 ASSAY THYROID STIM HORMONE: CPT

## 2021-11-20 PROCEDURE — 33285 INSJ SUBQ CAR RHYTHM MNTR: CPT

## 2021-11-20 PROCEDURE — 93320 DOPPLER ECHO COMPLETE: CPT

## 2021-11-20 PROCEDURE — 82553 CREATINE MB FRACTION: CPT

## 2021-11-20 PROCEDURE — 86703 HIV-1/HIV-2 1 RESULT ANTBDY: CPT

## 2021-11-20 PROCEDURE — C8929: CPT

## 2021-11-20 PROCEDURE — 97167 OT EVAL HIGH COMPLEX 60 MIN: CPT

## 2021-11-20 RX ORDER — ASPIRIN/CALCIUM CARB/MAGNESIUM 324 MG
1 TABLET ORAL
Qty: 30 | Refills: 0
Start: 2021-11-20 | End: 2021-12-19

## 2021-11-20 RX ADMIN — Medication 81 MILLIGRAM(S): at 12:56

## 2021-11-20 NOTE — PROGRESS NOTE ADULT - SUBJECTIVE AND OBJECTIVE BOX
Hospitalist Daily Progress Note    Chief Complaint:  Patient is a 61y old  Male who presents with a chief complaint of Acute onset hemiparesis and difficulty speaking (19 Nov 2021 19:21)      SUBJECTIVE / OVERNIGHT EVENTS:  Patient was seen and examined at bedside. States to feel well and is back at baseline. No active complaints. Ready to go home.   Patient denies chest pain, SOB, abd pain, N/V, fever, chills, dysuria or any other complaints. All remainder ROS negative.     MEDICATIONS  (STANDING):  aspirin  chewable 81 milliGRAM(s) Oral daily  ceFAZolin   IVPB 2000 milliGRAM(s) IV Intermittent once  dextrose 40% Gel 15 Gram(s) Oral once  dextrose 5%. 1000 milliLiter(s) (50 mL/Hr) IV Continuous <Continuous>  dextrose 5%. 1000 milliLiter(s) (100 mL/Hr) IV Continuous <Continuous>  dextrose 50% Injectable 25 Gram(s) IV Push once  dextrose 50% Injectable 12.5 Gram(s) IV Push once  dextrose 50% Injectable 25 Gram(s) IV Push once  enoxaparin Injectable 40 milliGRAM(s) SubCutaneous <User Schedule>  influenza   Vaccine 0.5 milliLiter(s) IntraMuscular once  insulin lispro (ADMELOG) corrective regimen sliding scale   SubCutaneous three times a day before meals    MEDICATIONS  (PRN):        I&O's Summary    19 Nov 2021 07:01  -  20 Nov 2021 07:00  --------------------------------------------------------  IN: 400 mL / OUT: 0 mL / NET: 400 mL        PHYSICAL EXAM:  Vital Signs Last 24 Hrs  T(C): 36.6 (20 Nov 2021 12:23), Max: 36.8 (19 Nov 2021 13:30)  T(F): 97.9 (20 Nov 2021 12:23), Max: 98.2 (19 Nov 2021 13:30)  HR: 88 (20 Nov 2021 12:23) (63 - 88)  BP: 103/72 (20 Nov 2021 12:23) (99/63 - 134/65)  BP(mean): 87 (19 Nov 2021 14:15) (83 - 87)  RR: 18 (20 Nov 2021 12:23) (18 - 20)  SpO2: 97% (20 Nov 2021 12:23) (90% - 100%)      Constitutional: NAD, Resting  ENT: Supple, No JVD  Lungs: CTA B/L, Non-labored breathing  Cardio: RRR, S1/S2, No murmur  Abdomen: Soft, Nontender, Nondistended; Bowel sounds present  Extremities: No calf tenderness, No pitting edema  Musculoskeletal:   No clubbing or cyanosis of digits; no joint swelling or tenderness to palpation  Psych: Calm, cooperative affect appropriate  Neuro: Awake and alert, oriented x4, moves all extremities  Skin: No rashes; no palpable lesions    LABS:                        13.4   4.34  )-----------( 224      ( 20 Nov 2021 09:12 )             42.3     11-20    140  |  105  |  13.0  ----------------------------<  83  4.9   |  23.0  |  0.60    Ca    9.0      20 Nov 2021 09:12  Phos  2.7     11-20  Mg     2.1     11-20    TPro  6.1<L>  /  Alb  3.2<L>  /  TBili  0.3<L>  /  DBili  0.1  /  AST  42<H>  /  ALT  28  /  AlkPhos  46  11-19      CARDIAC MARKERS ( 19 Nov 2021 07:12 )  x     / x     / 303 U/L / x     / 14.8 ng/mL          CAPILLARY BLOOD GLUCOSE      POCT Blood Glucose.: 68 mg/dL (20 Nov 2021 12:28)  POCT Blood Glucose.: 63 mg/dL (20 Nov 2021 12:27)  POCT Blood Glucose.: 78 mg/dL (20 Nov 2021 08:12)  POCT Blood Glucose.: 77 mg/dL (19 Nov 2021 21:19)  POCT Blood Glucose.: 73 mg/dL (19 Nov 2021 17:54)  POCT Blood Glucose.: 72 mg/dL (19 Nov 2021 15:10)  POCT Blood Glucose.: 60 mg/dL (19 Nov 2021 14:40)  POCT Blood Glucose.: 45 mg/dL (19 Nov 2021 14:39)  POCT Blood Glucose.: 68 mg/dL (19 Nov 2021 14:24)  POCT Blood Glucose.: 70 mg/dL (19 Nov 2021 14:03)        RADIOLOGY REVIEWED

## 2021-11-20 NOTE — PROGRESS NOTE ADULT - REASON FOR ADMISSION
Acute onset hemiparesis and difficulty speaking

## 2021-11-20 NOTE — PROGRESS NOTE ADULT - PROVIDER SPECIALTY LIST ADULT
Hospitalist
NSICU
Cardiology
Cardiology
Hospitalist
Intervent Radiology
Intervent Radiology
Hospitalist

## 2021-11-20 NOTE — PROGRESS NOTE ADULT - ASSESSMENT
61M PMH rheumatoid arthritis, polymyositis with baseline b/l upper extremity and RLE weakness, Raynaud's syndrome, only on holistic medication, presented to [facility] after acute onset altered mental status most likely secondary to left thalamic infarct.    #Acute Left Thalamic Infarct.   NIHSS 0; no residual affects   Initial MRI of brain negative but concerns for possible artifact  Repeat MRI with confirmation of left thalamic infarct  KENJI with small PFO with right to left shunt - As per CARDIOLOGY - Will need first to evaluate for A. Fib and if no A. fib found on ILR will then be a candidate for a PFO closure  s/p ILR placement  HbA1c 5.0; lipid panel reviewed LDL 70  Venous duplex negative  Continue Neurochecks q 4 hours  Continue ASA and hold Lipitor due to history of polymyositis per Dr. Ibarra  PT - home PT    #Rheumatoid arthritis.   not on any medications  Outpatient follow up with Rheumatology    #Raynaud disease.   outpatient follow up    #History of myositis.   outpatient follow up      DVT ppx - Lovenox SC    Dispo - Dc now

## 2021-11-23 PROBLEM — M06.9 RHEUMATOID ARTHRITIS, UNSPECIFIED: Chronic | Status: ACTIVE | Noted: 2021-11-17

## 2021-11-23 PROBLEM — I73.00 RAYNAUD'S SYNDROME WITHOUT GANGRENE: Chronic | Status: ACTIVE | Noted: 2021-11-17

## 2021-11-23 PROBLEM — Z87.39 PERSONAL HISTORY OF OTHER DISEASES OF THE MUSCULOSKELETAL SYSTEM AND CONNECTIVE TISSUE: Chronic | Status: ACTIVE | Noted: 2021-11-17

## 2021-12-07 DIAGNOSIS — R47.9 UNSPECIFIED SPEECH DISTURBANCES: ICD-10-CM

## 2021-12-07 DIAGNOSIS — Z78.9 OTHER SPECIFIED HEALTH STATUS: ICD-10-CM

## 2021-12-07 RX ORDER — ASPIRIN ENTERIC COATED TABLETS 81 MG 81 MG/1
81 TABLET, DELAYED RELEASE ORAL
Qty: 90 | Refills: 0 | Status: ACTIVE | COMMUNITY
Start: 2021-12-07

## 2021-12-08 ENCOUNTER — APPOINTMENT (OUTPATIENT)
Dept: ELECTROPHYSIOLOGY | Facility: CLINIC | Age: 61
End: 2021-12-08
Payer: COMMERCIAL

## 2021-12-08 ENCOUNTER — NON-APPOINTMENT (OUTPATIENT)
Age: 61
End: 2021-12-08

## 2021-12-08 VITALS
OXYGEN SATURATION: 97 % | TEMPERATURE: 97.9 F | HEART RATE: 72 BPM | DIASTOLIC BLOOD PRESSURE: 80 MMHG | HEIGHT: 72 IN | SYSTOLIC BLOOD PRESSURE: 116 MMHG | WEIGHT: 202 LBS | RESPIRATION RATE: 16 BRPM | BODY MASS INDEX: 27.36 KG/M2

## 2021-12-08 VITALS — DIASTOLIC BLOOD PRESSURE: 70 MMHG | SYSTOLIC BLOOD PRESSURE: 110 MMHG

## 2021-12-08 PROCEDURE — 99214 OFFICE O/P EST MOD 30 MIN: CPT

## 2021-12-08 PROCEDURE — 93000 ELECTROCARDIOGRAM COMPLETE: CPT

## 2021-12-11 ENCOUNTER — TRANSCRIPTION ENCOUNTER (OUTPATIENT)
Age: 61
End: 2021-12-11

## 2021-12-20 ENCOUNTER — NON-APPOINTMENT (OUTPATIENT)
Age: 61
End: 2021-12-20

## 2021-12-20 DIAGNOSIS — Z86.73 PERSONAL HISTORY OF TRANSIENT ISCHEMIC ATTACK (TIA), AND CEREBRAL INFARCTION W/OUT RESIDUAL DEFICITS: ICD-10-CM

## 2021-12-20 DIAGNOSIS — Z86.79 PERSONAL HISTORY OF OTHER DISEASES OF THE CIRCULATORY SYSTEM: ICD-10-CM

## 2021-12-20 DIAGNOSIS — M33.20 POLYMYOSITIS, ORGAN INVOLVEMENT UNSPECIFIED: ICD-10-CM

## 2021-12-21 ENCOUNTER — NON-APPOINTMENT (OUTPATIENT)
Age: 61
End: 2021-12-21

## 2021-12-21 ENCOUNTER — APPOINTMENT (OUTPATIENT)
Dept: CARDIOLOGY | Facility: CLINIC | Age: 61
End: 2021-12-21
Payer: COMMERCIAL

## 2021-12-21 VITALS
DIASTOLIC BLOOD PRESSURE: 68 MMHG | SYSTOLIC BLOOD PRESSURE: 102 MMHG | OXYGEN SATURATION: 96 % | BODY MASS INDEX: 27.09 KG/M2 | HEART RATE: 74 BPM | HEIGHT: 72 IN | WEIGHT: 200 LBS | TEMPERATURE: 98.1 F

## 2021-12-21 VITALS — SYSTOLIC BLOOD PRESSURE: 108 MMHG | DIASTOLIC BLOOD PRESSURE: 72 MMHG

## 2021-12-21 DIAGNOSIS — Z95.818 PRESENCE OF OTHER CARDIAC IMPLANTS AND GRAFTS: ICD-10-CM

## 2021-12-21 PROCEDURE — 99215 OFFICE O/P EST HI 40 MIN: CPT

## 2021-12-21 PROCEDURE — 93000 ELECTROCARDIOGRAM COMPLETE: CPT

## 2021-12-21 RX ORDER — HYDROXYCHLOROQUINE SULFATE 200 MG/1
200 TABLET ORAL TWICE DAILY
Refills: 0 | Status: ACTIVE | COMMUNITY

## 2021-12-21 NOTE — HISTORY OF PRESENT ILLNESS
[FreeTextEntry1] : cerebrovascular accident \par \par \par This is a 61 year old male with history of Rheumatoid arthritis, , (plaquinil HCQ), polymyosisits, autoimmune , raynauds,   here with cerebrovascular accident \par he was in hosptial for right sided weakness,  with Acute mental status. droopy moputh.  and that showed left thalamic infarct. \par he has been feelign fine now.\[facility]  \par he was not discharged on statins.  \par he was not on aspirin before this.

## 2021-12-21 NOTE — REASON FOR VISIT
[Symptom and Test Evaluation] : symptom and test evaluation [FreeTextEntry1] : cerebrovascular accident

## 2021-12-21 NOTE — DISCUSSION/SUMMARY
[Patient] : the patient [Risks] : risks [Benefits] : benefits [Alternatives] : alternatives [With Me] : with me [___ Month(s)] : in [unfilled] month(s) [FreeTextEntry1] : This is a 61 year old male with history of Rheumatoid arthritis, , (plaquinil HCQ), polymyosisits, autoimmune , raynauds,   here with cerebrovascular accident \par \par 1) cerebrovascular accident : most likely due to autoimmune disease.  only small PFO. \par will get rpeat limited TTE with cough to evaluet actual size of PFO.\par and ct loop recording.  aspirn 81 . no Dual antiplatelet therapy for now.  \par no statins for now aespecially since patient has polymyositiss\par patient will follow up with rheumatologist to evalute CRp and inflammatory levels and make sure that is under control./\par Will order and review ECG for the above mentioned diagnosis/condition/symptoms \par

## 2021-12-21 NOTE — CARDIOLOGY SUMMARY
[de-identified] : 12 21 2021  Sinus  Rhythm \par -Right bundle branch block. \par ABNORMAL  [de-identified] : 11 2021:  Small PFO.  no cardiac mass, vegetations or thrombus.  mild TR. \par  [de-identified] :  brain and neck CTA \par CT PERFUSION: No regional perfusion abnormality.\par l\par CTA BRAIN: Patent intracranial circulation. No flow-limiting stenosis or\par occlusion.\par \par CTA NECK: Patent cervical vasculature. No flow limiting stenosis or\par occlusion.\par \par Incidentally noted is soft tissue asymmetry along the left tonsillar region\par as described above, correlate clinically with visual inspection.\par \par MRi head : IMPRESSION: Small acute/subacute medial left thalamic infarct. [de-identified] : 3-4 years ago:  cardiac cath: unremarkable

## 2022-01-10 ENCOUNTER — APPOINTMENT (OUTPATIENT)
Dept: ELECTROPHYSIOLOGY | Facility: CLINIC | Age: 62
End: 2022-01-10
Payer: COMMERCIAL

## 2022-01-10 ENCOUNTER — NON-APPOINTMENT (OUTPATIENT)
Age: 62
End: 2022-01-10

## 2022-01-10 PROCEDURE — G2066: CPT

## 2022-01-10 PROCEDURE — 93298 REM INTERROG DEV EVAL SCRMS: CPT

## 2022-01-12 ENCOUNTER — APPOINTMENT (OUTPATIENT)
Dept: CARDIOLOGY | Facility: CLINIC | Age: 62
End: 2022-01-12

## 2022-01-18 ENCOUNTER — NON-APPOINTMENT (OUTPATIENT)
Age: 62
End: 2022-01-18

## 2022-01-18 ENCOUNTER — APPOINTMENT (OUTPATIENT)
Dept: CARDIOLOGY | Facility: CLINIC | Age: 62
End: 2022-01-18
Payer: COMMERCIAL

## 2022-01-18 PROCEDURE — 93308 TTE F-UP OR LMTD: CPT

## 2022-02-09 NOTE — HISTORY OF PRESENT ILLNESS
[de-identified] : Mr. Joyner is a 61 y/m with history of RA, polymyositis with baseline b/l upper extremity and RLE weakness, Raynaud's syndrome admitted to [facility] with TIA and small acute/ subacute medial left thalamic infarct. KENJI with normal LV function and small PFO. He is now s/p ILR implant for surveillance of cryptogenic stroke on 11/19/21.\par \par Today, he reports he has been feeling well since discharge. Remote ILR monitoring in place has revealed no events. Left parasternal incision well approximated without erythema, edema, or exudate. Pocket without hematoma. \par \par ECG in office reveals SR, RBBB

## 2022-02-09 NOTE — PHYSICAL EXAM
[General Appearance - Well Developed] : well developed [General Appearance - Well Nourished] : well nourished [Heart Rate And Rhythm] : heart rate and rhythm were normal [Heart Sounds] : normal S1 and S2 [Murmurs] : no murmurs present [Arterial Pulses Normal] : the arterial pulses were normal [Edema] : no peripheral edema present [Clean] : clean [Dry] : dry [Healing Well] : healing well [FreeTextEntry1] : Left parasternal

## 2022-02-09 NOTE — DISCUSSION/SUMMARY
[FreeTextEntry1] : Mr. Joyner is a 61 y/m with history of RA, polymyositis with baseline b/l upper extremity and RLE weakness, Raynaud's syndrome admitted to [facility] with TIA and small acute/ subacute medial left thalamic infarct. KENJI with normal LV function and small PFO. He is now s/p ILR implant for surveillance of cryptogenic stroke on 11/19/21.\par \par Today, he reports he has been feeling well since discharge. Remote ILR monitoring in place has revealed no events. Left parasternal incision well approximated without erythema, edema, or exudate. Pocket without hematoma. \par \par Recommendation:\par \par Site care, procedure for making symptom recordings and remote follow up reviewed. To f/u with Dr. Hubbard later this month as scheduled. Advised in person EP follow up annually or sooner if any pertinent ILR findings. \par \par Ellie Marshall ANP-C

## 2022-02-09 NOTE — REVIEW OF SYSTEMS
[Headache] : no headache [Feeling Fatigued] : not feeling fatigued [SOB] : no shortness of breath [Dyspnea on exertion] : not dyspnea during exertion [Chest Discomfort] : no chest discomfort [Palpitations] : no palpitations [Orthopnea] : no orthopnea [Dizziness] : no dizziness

## 2022-02-14 ENCOUNTER — NON-APPOINTMENT (OUTPATIENT)
Age: 62
End: 2022-02-14

## 2022-02-14 ENCOUNTER — APPOINTMENT (OUTPATIENT)
Dept: ELECTROPHYSIOLOGY | Facility: CLINIC | Age: 62
End: 2022-02-14
Payer: COMMERCIAL

## 2022-02-14 PROCEDURE — G2066: CPT

## 2022-02-14 PROCEDURE — 93298 REM INTERROG DEV EVAL SCRMS: CPT

## 2022-02-15 ENCOUNTER — APPOINTMENT (OUTPATIENT)
Dept: NEUROLOGY | Facility: CLINIC | Age: 62
End: 2022-02-15

## 2022-03-21 ENCOUNTER — NON-APPOINTMENT (OUTPATIENT)
Age: 62
End: 2022-03-21

## 2022-03-21 ENCOUNTER — APPOINTMENT (OUTPATIENT)
Dept: ELECTROPHYSIOLOGY | Facility: CLINIC | Age: 62
End: 2022-03-21
Payer: COMMERCIAL

## 2022-03-21 PROCEDURE — 93298 REM INTERROG DEV EVAL SCRMS: CPT

## 2022-03-21 PROCEDURE — G2066: CPT

## 2022-04-25 ENCOUNTER — APPOINTMENT (OUTPATIENT)
Dept: ELECTROPHYSIOLOGY | Facility: CLINIC | Age: 62
End: 2022-04-25
Payer: COMMERCIAL

## 2022-04-25 ENCOUNTER — NON-APPOINTMENT (OUTPATIENT)
Age: 62
End: 2022-04-25

## 2022-04-25 PROCEDURE — 93298 REM INTERROG DEV EVAL SCRMS: CPT

## 2022-04-25 PROCEDURE — G2066: CPT

## 2022-05-31 ENCOUNTER — APPOINTMENT (OUTPATIENT)
Dept: ELECTROPHYSIOLOGY | Facility: CLINIC | Age: 62
End: 2022-05-31
Payer: COMMERCIAL

## 2022-05-31 ENCOUNTER — NON-APPOINTMENT (OUTPATIENT)
Age: 62
End: 2022-05-31

## 2022-05-31 PROCEDURE — G2066: CPT

## 2022-05-31 PROCEDURE — 93298 REM INTERROG DEV EVAL SCRMS: CPT

## 2022-06-22 ENCOUNTER — APPOINTMENT (OUTPATIENT)
Dept: CARDIOLOGY | Facility: CLINIC | Age: 62
End: 2022-06-22

## 2022-06-22 ENCOUNTER — NON-APPOINTMENT (OUTPATIENT)
Age: 62
End: 2022-06-22

## 2022-06-22 VITALS
OXYGEN SATURATION: 99 % | DIASTOLIC BLOOD PRESSURE: 80 MMHG | TEMPERATURE: 98.1 F | WEIGHT: 206 LBS | HEIGHT: 72 IN | BODY MASS INDEX: 27.9 KG/M2 | HEART RATE: 83 BPM | SYSTOLIC BLOOD PRESSURE: 119 MMHG

## 2022-06-22 DIAGNOSIS — M06.9 RHEUMATOID ARTHRITIS, UNSPECIFIED: ICD-10-CM

## 2022-06-22 DIAGNOSIS — I73.00 RAYNAUD'S SYNDROME W/OUT GANGRENE: ICD-10-CM

## 2022-06-22 PROCEDURE — 93000 ELECTROCARDIOGRAM COMPLETE: CPT

## 2022-06-22 PROCEDURE — 99215 OFFICE O/P EST HI 40 MIN: CPT

## 2022-06-22 RX ORDER — ROSUVASTATIN CALCIUM 5 MG/1
5 TABLET, FILM COATED ORAL
Qty: 90 | Refills: 0 | Status: DISCONTINUED | COMMUNITY
Start: 2022-03-15

## 2022-06-28 ENCOUNTER — APPOINTMENT (OUTPATIENT)
Dept: CARDIOLOGY | Facility: CLINIC | Age: 62
End: 2022-06-28

## 2022-06-28 VITALS
OXYGEN SATURATION: 96 % | DIASTOLIC BLOOD PRESSURE: 80 MMHG | BODY MASS INDEX: 28.07 KG/M2 | TEMPERATURE: 99.3 F | HEART RATE: 88 BPM | SYSTOLIC BLOOD PRESSURE: 136 MMHG | WEIGHT: 207 LBS

## 2022-06-28 PROCEDURE — 99204 OFFICE O/P NEW MOD 45 MIN: CPT

## 2022-06-28 RX ORDER — INSULIN LISPRO 100 U/ML
100 INJECTION, SOLUTION INTRAVENOUS; SUBCUTANEOUS
Refills: 0 | Status: DISCONTINUED | COMMUNITY
End: 2022-06-28

## 2022-06-28 RX ORDER — FELODIPINE 2.5 MG/1
2.5 TABLET, EXTENDED RELEASE ORAL DAILY
Refills: 0 | Status: ACTIVE | COMMUNITY

## 2022-06-28 RX ORDER — ADALIMUMAB 40MG/0.4ML
40 KIT SUBCUTANEOUS
Refills: 0 | Status: ACTIVE | COMMUNITY

## 2022-07-11 NOTE — DISCUSSION/SUMMARY
[Patient] : the patient [Risks] : risks [Benefits] : benefits [Alternatives] : alternatives [With Me] : with me [___ Month(s)] : in [unfilled] month(s) [FreeTextEntry1] : This is a 61 year old male with history of Rheumatoid arthritis, , (plaquinil HCQ), polymyosisits, autoimmune , raynauds,   here with cerebrovascular accident \par \par 1) cerebrovascular accident : most likely due to autoimmune disease.  large size PFO.  despite that. its unlijkely to casue stroke at this age. \par willdsicuss about low dose anticoagulation eliqwuis 2.5 mg Q12. however, we will also send to Dr. britt for PFO clsoure second opinion whether we should close this PFO for preventing stroke. \par  \par and ct loop recording.  aspirn 81 . no Dual antiplatelet therapy for now.  \par no statins for now aespecially since patient has polymyositiss\par patient will follow up with rheumatologist to evaluate CRp and inflammatory levels and make sure that is under control.  \par we will discuss about low dise eliwuyis  for the underlyign autoimmune hypercoagulable state. \par \par 2) raynaud's: felodipine.\par 3) Will order and review ECG for the above mentioned diagnosis/condition/symptoms  \par Will order and review ECG for the above mentioned diagnosis/condition/symptoms  a\par  [EKG obtained to assist in diagnosis and management of assessed problem(s)] : EKG obtained to assist in diagnosis and management of assessed problem(s)

## 2022-07-11 NOTE — HISTORY OF PRESENT ILLNESS
[FreeTextEntry1] : cerebrovascular accident \par \par HPI for today: : feels good. no chest pain . no dizzness. recently flare. on steroids and IvIG. \par no syncope,. complaitn with eds \par he is takign aspirin 81 . \par  as\par \par \par old note: This is a 61 year old male with history of Rheumatoid arthritis, , (plaquinil HCQ), polymyosisits, autoimmune , raynauds,   here with cerebrovascular accident \par he was in hosptial for right sided weakness,  with Acute mental status. droopy moputh.  and that showed left thalamic infarct. \par he has been feelign fine now.\par [facility].  \par he was not discharged on statins.  \par he was not on aspirin before this.

## 2022-07-11 NOTE — CARDIOLOGY SUMMARY
[de-identified] : 6 22 2022  Sinus  Rhythm \par -Right bundle branch block. \par ABNORMAL \par \par 12 21 2021  Sinus  Rhythm \par -Right bundle branch block. \par ABNORMAL  [de-identified] : 11 2021:  Small PFO.  no cardiac mass, vegetations or thrombus.  mild TR. \par large PFO. \par  [de-identified] :  brain and neck CTA \par CT PERFUSION: No regional perfusion abnormality.\par l\par CTA BRAIN: Patent intracranial circulation. No flow-limiting stenosis or\par occlusion.\par \par CTA NECK: Patent cervical vasculature. No flow limiting stenosis or\par occlusion.\par \par Incidentally noted is soft tissue asymmetry along the left tonsillar region\par as described above, correlate clinically with visual inspection.\par \par MRi head : IMPRESSION: Small acute/subacute medial left thalamic infarct. [de-identified] : 3-4 years ago:  cardiac cath: unremarkable

## 2022-07-26 ENCOUNTER — NON-APPOINTMENT (OUTPATIENT)
Age: 62
End: 2022-07-26

## 2022-07-26 ENCOUNTER — APPOINTMENT (OUTPATIENT)
Dept: ELECTROPHYSIOLOGY | Facility: CLINIC | Age: 62
End: 2022-07-26

## 2022-07-26 PROCEDURE — 93298 REM INTERROG DEV EVAL SCRMS: CPT

## 2022-07-26 PROCEDURE — G2066: CPT

## 2022-08-30 ENCOUNTER — APPOINTMENT (OUTPATIENT)
Dept: ELECTROPHYSIOLOGY | Facility: CLINIC | Age: 62
End: 2022-08-30

## 2022-08-30 ENCOUNTER — NON-APPOINTMENT (OUTPATIENT)
Age: 62
End: 2022-08-30

## 2022-08-30 PROCEDURE — 93298 REM INTERROG DEV EVAL SCRMS: CPT

## 2022-08-30 PROCEDURE — G2066: CPT

## 2022-10-04 ENCOUNTER — APPOINTMENT (OUTPATIENT)
Dept: ELECTROPHYSIOLOGY | Facility: CLINIC | Age: 62
End: 2022-10-04

## 2022-10-04 ENCOUNTER — NON-APPOINTMENT (OUTPATIENT)
Age: 62
End: 2022-10-04

## 2022-10-04 PROCEDURE — 93298 REM INTERROG DEV EVAL SCRMS: CPT

## 2022-10-04 PROCEDURE — G2066: CPT

## 2022-11-08 ENCOUNTER — NON-APPOINTMENT (OUTPATIENT)
Age: 62
End: 2022-11-08

## 2022-11-08 ENCOUNTER — APPOINTMENT (OUTPATIENT)
Dept: ELECTROPHYSIOLOGY | Facility: CLINIC | Age: 62
End: 2022-11-08

## 2022-11-08 PROCEDURE — 93298 REM INTERROG DEV EVAL SCRMS: CPT

## 2022-11-08 PROCEDURE — G2066: CPT

## 2022-12-13 ENCOUNTER — NON-APPOINTMENT (OUTPATIENT)
Age: 62
End: 2022-12-13

## 2022-12-13 ENCOUNTER — APPOINTMENT (OUTPATIENT)
Dept: ELECTROPHYSIOLOGY | Facility: CLINIC | Age: 62
End: 2022-12-13

## 2022-12-13 PROCEDURE — G2066: CPT

## 2022-12-13 PROCEDURE — 93298 REM INTERROG DEV EVAL SCRMS: CPT

## 2023-01-19 ENCOUNTER — APPOINTMENT (OUTPATIENT)
Dept: ELECTROPHYSIOLOGY | Facility: CLINIC | Age: 63
End: 2023-01-19
Payer: COMMERCIAL

## 2023-01-19 ENCOUNTER — NON-APPOINTMENT (OUTPATIENT)
Age: 63
End: 2023-01-19

## 2023-01-19 PROCEDURE — G2066: CPT

## 2023-01-19 PROCEDURE — 93298 REM INTERROG DEV EVAL SCRMS: CPT

## 2023-01-20 ENCOUNTER — APPOINTMENT (OUTPATIENT)
Dept: CARDIOLOGY | Facility: CLINIC | Age: 63
End: 2023-01-20
Payer: COMMERCIAL

## 2023-01-20 ENCOUNTER — NON-APPOINTMENT (OUTPATIENT)
Age: 63
End: 2023-01-20

## 2023-01-20 VITALS
WEIGHT: 193 LBS | BODY MASS INDEX: 26.14 KG/M2 | HEART RATE: 76 BPM | DIASTOLIC BLOOD PRESSURE: 70 MMHG | SYSTOLIC BLOOD PRESSURE: 106 MMHG | OXYGEN SATURATION: 96 % | HEIGHT: 72 IN | TEMPERATURE: 98.9 F

## 2023-01-20 DIAGNOSIS — Z00.00 ENCOUNTER FOR GENERAL ADULT MEDICAL EXAMINATION W/OUT ABNORMAL FINDINGS: ICD-10-CM

## 2023-01-20 DIAGNOSIS — Q21.1 ATRIAL SEPTAL DEFECT: ICD-10-CM

## 2023-01-20 DIAGNOSIS — G81.90 HEMIPLEGIA, UNSPECIFIED AFFECTING UNSPECIFIED SIDE: ICD-10-CM

## 2023-01-20 DIAGNOSIS — I63.9 CEREBRAL INFARCTION, UNSPECIFIED: ICD-10-CM

## 2023-01-20 PROCEDURE — 93000 ELECTROCARDIOGRAM COMPLETE: CPT

## 2023-01-20 PROCEDURE — 99215 OFFICE O/P EST HI 40 MIN: CPT | Mod: 25

## 2023-01-20 RX ORDER — TRAZODONE HYDROCHLORIDE 50 MG/1
50 TABLET ORAL
Refills: 0 | Status: ACTIVE | COMMUNITY

## 2023-01-20 RX ORDER — PANTOPRAZOLE 40 MG/1
40 TABLET, DELAYED RELEASE ORAL DAILY
Refills: 0 | Status: ACTIVE | COMMUNITY
Start: 2021-12-16

## 2023-01-20 RX ORDER — DOXYCYCLINE HYCLATE 100 MG/1
100 CAPSULE ORAL TWICE DAILY
Refills: 0 | Status: ACTIVE | COMMUNITY

## 2023-01-20 NOTE — HISTORY OF PRESENT ILLNESS
[FreeTextEntry1] : cerebrovascular accident \par \par HPI for today: :  his wife  .  and she had significnat auto immune flare.  but also he was diagnosed with muscle aches and joint pain.  was diagvnosed with ehlichiosis.  simimlar to auto immune condition started with doxycycline.  \par no chest pain . no .dizziness. no syncope. no lapitaitons  no new symptoms of transient ischemic attack  . no new strokes.  or neurological episodes.  has large size PFo. -  was seen by [facility] for the need for closure. \par  was started on a.c last visit. was stopped . \par he did see hematologist for anticoagulation .\par \par \par odl note:  feels good. no chest pain . no dizzness. recently flare. on steroids and IvIG. \par no syncope,. complaitn with eds \par he is takign aspirin 81 . \par  as\par \par \par old note: This is a 61 year old male with history of Rheumatoid arthritis, , (plaquinil HCQ), polymyosisits, autoimmune , raynauds,   here with cerebrovascular accident \par he was in hosptial for right sided weakness,  with Acute mental status. droopy moputh.  and that showed left thalamic infarct. \par he has been feelign fine now.\par he was discharged from [facility] with loop recorder.  \par he was not discharged on statins.  \par he was not on aspirin before this.

## 2023-01-20 NOTE — DISCUSSION/SUMMARY
[Patient] : the patient [Risks] : risks [Benefits] : benefits [Alternatives] : alternatives [With Me] : with me [___ Year(s)] : in [unfilled] year(s) [EKG obtained to assist in diagnosis and management of assessed problem(s)] : EKG obtained to assist in diagnosis and management of assessed problem(s) [FreeTextEntry1] : This is a 61 year old male with history of Rheumatoid arthritis, , (plaquinil HCQ), polymyosisits, autoimmune , raynauds,   here with cerebrovascular accident \par \par 1) cerebrovascular accident : most likely due to autoimmune disease.  large size PFO.  on aspirin now. \par  loop negative . unclear caus eof cerebrovascular accident .  other possiblity Autioimmune condition. \par no statins for now aespecially since patient has polymyositiss\par patient will follow up with rheumatologist to  controlle her underlyign CTD \par  seen hematolgoist . not on anticoagulation  was stopped .  but if recurrent symptoms. will strongl;y recommend anticoagulation for large PFo and underlyign auto immune condition. \par loop recorder negative for 1 year out of pocket expense.  no further indication to continue monitoring. \par will stop monitoriong loop recorder.  will return the monitorign device.  will not extract loop for now. \par 2) raynaud's: felodipine.\par 3) coronary artery disease evaluation:  last cath was 4-5 yearsago. no active [id number]. Ct calcium  score.  \par 40 lipid prpfoeile  need results from of lipid profile. \par  s\par

## 2023-02-23 ENCOUNTER — APPOINTMENT (OUTPATIENT)
Dept: ELECTROPHYSIOLOGY | Facility: CLINIC | Age: 63
End: 2023-02-23

## 2023-06-22 ENCOUNTER — APPOINTMENT (OUTPATIENT)
Dept: NEUROSURGERY | Facility: CLINIC | Age: 63
End: 2023-06-22
Payer: COMMERCIAL

## 2023-06-22 VITALS
DIASTOLIC BLOOD PRESSURE: 84 MMHG | SYSTOLIC BLOOD PRESSURE: 126 MMHG | BODY MASS INDEX: 24.11 KG/M2 | HEIGHT: 72 IN | WEIGHT: 178 LBS

## 2023-06-22 DIAGNOSIS — M50.90 CERVICAL DISC DISORDER, UNSPECIFIED, UNSPECIFIED CERVICAL REGION: ICD-10-CM

## 2023-06-22 PROCEDURE — 99214 OFFICE O/P EST MOD 30 MIN: CPT

## 2023-06-22 NOTE — RESULTS/DATA
[FreeTextEntry1] : [facility]ga MRI patient ID [id number] was reviewed in detail showing mild multilevel cervical spondylosis without evidence of overt cord compression.  At C3-4 and C5-6 there are some disc osteophytes however there is no signal change in there is minimal effacement of the cord itself the cord does not have true compression at these levels in my opinion.\par

## 2023-06-22 NOTE — PLAN
[FreeTextEntry1] : \par This is a patient with mild cervical spondylosis.  He really displays more signs and symptoms of someone with a diffuse neuromuscular condition.  Differential diagnosis includes many conditions including advanced polymyositis or something more insidious like ALS.\par \par I recommended he see a neurologist and get neuromuscular studies performed as well as the appropriate blood work.  Would likely require a brain MRI and additional work-up as well.  There is no role for neurosurgical intervention from my point of view.

## 2023-06-22 NOTE — HISTORY OF PRESENT ILLNESS
[de-identified] : \par \par \par Nate is a pleasant 62-year-old gentleman here for evaluation of his cervical spine.  He has a fairly complicated past history with multiple autoimmune disorders being treated for quite some time.  As noted in the note in the past history he has had COVID, he has rheumatoid arthritis Raynaud's disease and poly myositis.  He has had a cryptogenic stroke which he is made of pretty fair recovery from as well.  He is ambulatory is independent he has significant weakness in the arms and legs difficulties with  strength.  He had a recent MRI at [facility] for my review.  There is no MRI of the brain.  There is no electrodiagnostic studies for my review.  He does not have signs and symptoms that are really significant to suggest myelopathy.  He has signs and symptoms that really are more consistent with a neuromuscular disorder.  On top of all of this he is also had [place] virus and tickborne disease in the more recent past.\par \par \par auto immune disease    RA  PM  Raynauds\par \par CVA\par COVID \par EBV      Tick disease \par \par Stoebbe\par Zohra\par Tram\par \par

## 2023-06-22 NOTE — PHYSICAL EXAM
[Motor Handedness Right-Handed] : the patient is right hand dominant [Romberg's Sign] : Romberg's sign was negtive [FreeTextEntry6] : He has decreased motor tone throughout with atrophy in all 4 extremities.  He has weakness proximally and distally.  He is barely antigravity with his arms and his  strength is diminished.  He has numbness and tingling in a nondermatomal distribution. [FreeTextEntry8] : He has difficulty lifting the legs and ankles however ambulates readily.  He has proximal and distal leg weakness diminished deep tendon reflexes.

## 2023-07-20 ENCOUNTER — OFFICE (OUTPATIENT)
Dept: URBAN - METROPOLITAN AREA CLINIC 38 | Facility: CLINIC | Age: 63
Setting detail: OPHTHALMOLOGY
End: 2023-07-20
Payer: COMMERCIAL

## 2023-07-20 DIAGNOSIS — Z79.891: ICD-10-CM

## 2023-07-20 DIAGNOSIS — M06.9: ICD-10-CM

## 2023-07-20 DIAGNOSIS — H25.13: ICD-10-CM

## 2023-07-20 DIAGNOSIS — H01.004: ICD-10-CM

## 2023-07-20 DIAGNOSIS — H35.40: ICD-10-CM

## 2023-07-20 DIAGNOSIS — H01.001: ICD-10-CM

## 2023-07-20 DIAGNOSIS — H43.813: ICD-10-CM

## 2023-07-20 DIAGNOSIS — H02.831: ICD-10-CM

## 2023-07-20 DIAGNOSIS — H02.834: ICD-10-CM

## 2023-07-20 PROCEDURE — 92015 DETERMINE REFRACTIVE STATE: CPT | Performed by: OPHTHALMOLOGY

## 2023-07-20 PROCEDURE — 92014 COMPRE OPH EXAM EST PT 1/>: CPT | Performed by: OPHTHALMOLOGY

## 2023-07-20 ASSESSMENT — REFRACTION_AUTOREFRACTION
OS_CYLINDER: -1.00
OS_AXIS: 178
OD_SPHERE: +2.25
OD_AXIS: 171
OD_CYLINDER: -1.00
OS_SPHERE: +2.25

## 2023-07-20 ASSESSMENT — REFRACTION_CURRENTRX
OS_CYLINDER: -0.50
OD_OVR_VA: 20/
OS_CYLINDER: -0.50
OD_ADD: +2.25
OS_OVR_VA: 20/
OS_AXIS: 176
OD_VPRISM_DIRECTION: PROGS
OS_OVR_VA: 20/
OD_OVR_VA: 20/
OD_VPRISM_DIRECTION: SV
OD_CYLINDER: -1.00
OD_CYLINDER: -0.75
OD_AXIS: 171
OS_AXIS: 180
OS_SPHERE: +0.50
OS_VPRISM_DIRECTION: PROGS
OS_ADD: +2.25
OD_SPHERE: +0.75
OS_SPHERE: +0.75
OD_SPHERE: +1.00
OS_VPRISM_DIRECTION: SV
OD_AXIS: 180

## 2023-07-20 ASSESSMENT — TONOMETRY
OD_IOP_MMHG: 12
OS_IOP_MMHG: 12
OS_IOP_MMHG: 12
OD_IOP_MMHG: 12

## 2023-07-20 ASSESSMENT — REFRACTION_MANIFEST
OD_SPHERE: +1.00
OS_SPHERE: +1.50
OS_SPHERE: +1.00
OD_VA1: 20/20
OS_VA1: 20/20-1
OD_ADD: +2.25
OS_VA1: 20/20
OS_CYLINDER: -1.00
OU_VA: 20/20
OS_ADD: +2.50
OD_CYLINDER: -1.00
OD_AXIS: 170
OD_AXIS: 170
OD_CYLINDER: -1.00
OS_VA2: 20/20(J1+)
OD_SPHERE: +1.50
OS_ADD: +2.25
OS_AXIS: 180
OD_VA2: 20/20(J1+)
OD_VA1: 20/20
OS_AXIS: 005
OD_ADD: +2.50
OS_CYLINDER: -0.75

## 2023-07-20 ASSESSMENT — KERATOMETRY
OS_AXISANGLE_DEGREES: 089
OD_K2POWER_DIOPTERS: 43.75
OS_K1POWER_DIOPTERS: 42.25
OD_AXISANGLE_DEGREES: 093
OS_K2POWER_DIOPTERS: 44.50
OD_K1POWER_DIOPTERS: 41.75

## 2023-07-20 ASSESSMENT — AXIALLENGTH_DERIVED
OS_AL: 23.396
OD_AL: 23.6729
OS_AL: 22.9727
OD_AL: 23.1925
OS_AL: 23.2531
OD_AL: 23.4783

## 2023-07-20 ASSESSMENT — CONFRONTATIONAL VISUAL FIELD TEST (CVF)
OS_FINDINGS: FULL
OD_FINDINGS: FULL

## 2023-07-20 ASSESSMENT — SPHEQUIV_DERIVED
OS_SPHEQUIV: 0.625
OS_SPHEQUIV: 1.75
OD_SPHEQUIV: 1.75
OD_SPHEQUIV: 1
OS_SPHEQUIV: 1
OD_SPHEQUIV: 0.5

## 2023-07-20 ASSESSMENT — LID POSITION - DERMATOCHALASIS
OS_DERMATOCHALASIS: 1+ 2+
OD_DERMATOCHALASIS: 1+ 2+

## 2023-07-20 ASSESSMENT — VISUAL ACUITY
OS_BCVA: 20/20-2
OD_BCVA: 20/20

## 2023-07-20 ASSESSMENT — LID EXAM ASSESSMENTS
OD_BLEPHARITIS: RUL T
OS_BLEPHARITIS: LUL T

## 2023-07-24 ENCOUNTER — OFFICE (OUTPATIENT)
Dept: URBAN - METROPOLITAN AREA CLINIC 38 | Facility: CLINIC | Age: 63
Setting detail: OPHTHALMOLOGY
End: 2023-07-24
Payer: COMMERCIAL

## 2023-07-24 DIAGNOSIS — H90.3: ICD-10-CM

## 2023-07-24 PROCEDURE — 92557 COMPREHENSIVE HEARING TEST: CPT | Performed by: AUDIOLOGIST

## 2023-07-24 PROCEDURE — V5261 HEARING AID, DIGIT, BIN, BTE: HCPCS | Performed by: AUDIOLOGIST

## 2023-07-24 PROCEDURE — 92567 TYMPANOMETRY: CPT | Performed by: AUDIOLOGIST

## 2023-08-10 ENCOUNTER — OFFICE (OUTPATIENT)
Dept: URBAN - METROPOLITAN AREA CLINIC 12 | Facility: CLINIC | Age: 63
Setting detail: OPHTHALMOLOGY
End: 2023-08-10

## 2023-08-10 DIAGNOSIS — H90.3: ICD-10-CM

## 2023-08-10 PROCEDURE — HAD HEARING AID DISPENSE: Performed by: AUDIOLOGIST

## 2023-08-28 ENCOUNTER — OFFICE (OUTPATIENT)
Dept: URBAN - METROPOLITAN AREA CLINIC 38 | Facility: CLINIC | Age: 63
Setting detail: OPHTHALMOLOGY
End: 2023-08-28

## 2023-08-28 DIAGNOSIS — H90.3: ICD-10-CM

## 2023-08-28 PROCEDURE — 92593 HEARING AID CHECK; BINAURAL: CPT | Performed by: AUDIOLOGIST

## 2023-11-02 NOTE — PATIENT PROFILE ADULT - NSPROSPHOSPCHAPLAINYN_GEN_A_NUR
History Of Present Illness  Shabbir Laurent is a 74 y.o. male presents for procedure state below. Endorses no changes in past medical history or medical health since last seen in clinic.     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), Pain in right ankle and joints of right foot (01/15/2020), Personal history of colonic polyps, Personal history of diseases of the skin and subcutaneous tissue (09/29/2016), Personal history of other (healed) physical injury and trauma (08/11/2014), Personal history of other diseases of the circulatory system, Personal history of other diseases of the musculoskeletal system and connective tissue, Personal history of other diseases of the musculoskeletal system and connective tissue (07/06/2013), Personal history of other diseases of the nervous system and sense organs (10/28/2013), Personal history of other diseases of the respiratory system (03/04/2019), Personal history of other endocrine, nutritional and metabolic disease, Personal history of other endocrine, nutritional and metabolic disease (05/02/2019), Personal history of other endocrine, nutritional and metabolic disease (05/10/2018), Personal history of other mental and behavioral disorders (04/07/2020), Personal history of other specified conditions, Personal history of other specified conditions, Prediabetes (10/04/2018), Presence of aortocoronary bypass graft, Presence of prosthetic heart valve, Rash and other nonspecific skin eruption (01/10/2017), Spondylosis without myelopathy or radiculopathy, lumbar  region (07/06/2013), Strain of unspecified muscles, fascia and tendons at thigh level, unspecified thigh, initial encounter (12/05/2013), Unspecified asthma, uncomplicated (03/18/2019), and Unspecified fall, initial encounter (09/26/2019).    Surgical History  He has a past surgical history that includes Knee arthroscopy w/ debridement (10/22/2013); Gallbladder surgery (10/22/2013); Colonoscopy (05/16/2013); Coronary artery bypass graft (04/23/2018); Other surgical history (04/23/2018); Colonoscopy (09/21/2017); Cardiac catheterization (04/23/2018); Colonoscopy (07/25/2014); Other surgical history (10/02/2014); Aortic valve replacement (06/25/2014); Other surgical history (08/03/2022); Other surgical history (08/03/2022); and Other surgical history (10/28/2013).     Social History  He reports that he has quit smoking. His smoking use included cigarettes. He has never used smokeless tobacco. He reports that he does not drink alcohol and does not use drugs.    Family History  Family History   Problem Relation Name Age of Onset    Arthritis Mother      Other (Stroke Syndrome) Mother      Arthritis Father      Other (CABG) Father      Sleep apnea Son          Allergies  Amiloride-hydrochlorothiazide, Ceramide 3b, Chlorpheniramine-phenylpropan, Colchicine, Hydrochlorothiazide, and Phenylpropanolamine    Review of Symptoms:   Constitutional: Negative for chills, diaphoresis or fever  HENT: Negative for neck swelling  Eyes:.  Negative for eye pain  Respiratory:.  Negative for cough, shortness of breath or wheezing    Cardiovascular:.  Negative for chest pain or palpitations  Gastrointestinal:.  Negative for abdominal pain, nausea and vomiting  Genitourinary:.  Negative for urgency  Musculoskeletal:  Positive for back pain. Positive for joint pain. Denies falls within the past 3 months.  Skin: Negative for wounds or itching   Neurological: Negative for dizziness, seizures, loss of consciousness and  weakness  Endo/Heme/Allergies: Does not bruise/bleed easily  Psychiatric/Behavioral: Negative for depression. The patient does not appear anxious.       PHYSICAL EXAM  Vitals signs reviewed  Constitutional:       General: Not in acute distress     Appearance: Normal appearance. Not ill-appearing.  HENT:     Head: Normocephalic and atraumatic  Eyes:     Conjunctiva/sclera: Conjunctivae normal  Cardiovascular:     Rate and Rhythm: Normal rate and regular rhythm  Pulmonary:     Effort: No respiratory distress  Abdominal:     Palpations: Abdomen is soft  Musculoskeletal: DEL CID  Skin:     General: Skin is warm and dry  Neurological:     General: No focal deficit present  Psychiatric:         Mood and Affect: Mood normal         Behavior: Behavior normal     Last Recorded Vitals  /78   Pulse 65   Resp 20     Relevant Results  Current Outpatient Medications   Medication Instructions    aspirin 81 mg EC tablet 1 tablet, oral, Daily    blood glucose control, normal solution Use as directed    cetirizine (ZYRTEC) 10 mg capsule 1 capsule, oral, Daily    cholecalciferol (Vitamin D-3) 25 MCG (1000 UT) capsule 1 capsule, oral, Daily    cholecalciferol (Vitamin D-3) 50 mcg (2,000 unit) capsule 1 capsule, oral, Daily    diazePAM (Valium) 5 mg tablet Take 1 tablet 1 hour prior to intervention.  Take second tablet 1 hour later prior to intervention.    enoxaparin (LOVENOX) 120 mg, subcutaneous, Every 12 hours    fluocinonide 0.05 % cream 1 Application    FLUoxetine (PROZAC) 20 mg, oral, Daily    fluticasone propion-salmeteroL (Advair Diskus) 250-50 mcg/dose diskus inhaler 1 puff, inhalation, 2 times daily RT, During summer (grass cutting)    folic acid (FOLVITE) 1 mg, oral, Daily    furosemide (LASIX) 20 mg, oral, Daily    HYDROcodone-acetaminophen (Norco) 5-325 mg tablet 1 tablet, oral, 3 times daily PRN    [START ON 11/14/2023] HYDROcodone-acetaminophen (Norco) 5-325 mg tablet 1 tablet, oral, 3 times daily PRN    [START ON  12/12/2023] HYDROcodone-acetaminophen (Norco) 5-325 mg tablet 1 tablet, oral, 3 times daily PRN    Jardiance 25 mg TAKE 1 TABLET BY MOUTH DAILY    ketoconazole (NIZOral) 2 % shampoo Apply 0.5 Applications topically 3 times a week. PRN    lancets 33 gauge misc 1 each, Daily    leflunomide (ARAVA) 20 mg, oral, Daily    levothyroxine (SYNTHROID, LEVOXYL) 137 mcg, oral, Daily    losartan (COZAAR) 100 mg, oral, Daily    metFORMIN (GLUCOPHAGE) 850 mg, oral, 2 times daily with meals    multivit-min/FA/lycopen/lutein (CENTRUM SILVER MEN ORAL) 1 tablet, oral, Daily    OneTouch Ultra Test strip 1 each, Daily    pregabalin (LYRICA) 200 mg, oral, Nightly    psyllium (Metamucil) 0.4 gram capsule 1 capsule, oral, 2 times daily    psyllium husk, aspartame, (Metamucil Sugar-Free, aspart,) 3.4 gram/5.8 gram powder     rosuvastatin (Crestor) 20 mg tablet 1 tablet, oral, Daily    rosuvastatin (CRESTOR) 10 mg, oral, Daily    triamcinolone (Kenalog) 0.1 % cream 1 Application 2 times a day. PRN    warfarin (Coumadin) 5 mg tablet TAKE 1 AND 1/2 TABLETS BY MOUTH  5 TIMES WEEKLY AND 1 TABLET BY  MOUTH TWICE WEEKLY DAILY OR AS  DIRECTED AFTER INR TESTING. DO  NOT START BEFORE MARCH 11, 2023         MR lumbar spine wo IV contrast 08/27/2023    Narrative  Interpreted By:  WILLY PEREZ MD, PHD  MRN: [id number]  Patient Name: GIORGIO EDWARDS    STUDY:  MRI L-SPINE WO;  8/27/2023 10:40 am    INDICATION:  Persistent low back pain and radiating hip and pelvis pain and groin  pain on the right.  Can radiate towards the knee.  Sharp and  stabbing.  More persistent and intense over the last 6 months.  Weakness with weightbearing activities.  No fall    COMPARISON:  Lumbar spine radiographs, same day and lumbar spine MRI, 03/12/2021    ACCESSION NUMBER(S):  [id number]    ORDERING CLINICIAN:  AVELINA MELO    TECHNIQUE:  Sagittal T1, T2, STIR, axial T1 and T2 weighted images of the lumbar  spine were acquired.    FINDINGS:  5 lumbar-type vertebral bodies  are again noted, the last well-formed  disc space is labeled L5-S1.    Alignment: Mild retrolisthesis at L1-2, L2-3, and L3-4 and grade 1  anterolisthesis at L4-5, similar to previous.    Vertebrae/Intervertebral Discs: The vertebral bodies demonstrate  expected height. Multilevel degenerative endplate changes with  associated marrow edema at L2-3. Nodular T1 hyperintense signal at L1  is unchanged from previous and may represent focal fatty marrow or a  benign hemangioma. Multilevel loss of normal disc T2 signal intensity  and disc height.    Conus: The lower thoracic cord appears unremarkable. The conus  terminates at L1. The cauda equina is unremarkable.    T12-L1: Disc bulge and facet hypertrophy. No canal or foraminal  narrowing.    L1-2: Disc bulge, facet hypertrophy, and ligamentum flavum thickening  with diffuse mild spinal canal narrowing and mild bilateral neural  foramen narrowing, similar to previous.    L2-3: Disc bulge with a superimposed inferiorly directed right  central disc extrusion, facet hypertrophy, ligamentum flavum  thickening, and prominent epidural fat with severe spinal canal  narrowing and moderate left and severe right neural foramen  narrowing, similar to previous.    L3-4: Disc bulge, facet hypertrophy, ligamentum flavum thickening,  and prominent epidural fat with diffuse moderate spinal canal  narrowing and moderate bilateral neural foramen narrowing, similar to  previous.    L4-5: Disc bulge, facet hypertrophy, and ligamentum flavum thickening  with diffuse moderate spinal canal narrowing and mild right and  moderate left neural foramen narrowing, similar to previous.    L5-S1: Disc bulge, facet hypertrophy, and ligamentum flavum  thickening with mild left neural foramen narrowing, similar to  previous.    Marked paraspinal muscular atrophy, the paraspinal soft tissues are  otherwise unremarkable. Partially visualized distension of the  bladder.    Impression  Stable degenerative  changes most pronounced at L2-3.      MR CERVICAL SPINE WO IV CONTRAST 02/18/2022    Narrative  MRN: [id number]  Patient Name: GIORGIO EDWARDS    STUDY:  MRI CERVICAL WO;  2/18/2022 11:43 am    INDICATION:  severe neck pain and cracking noise and feel dizziness when bends  head and neck in different directions  R42: Vertigo M47.812: Cervical  arthritis G95.9: Cervical myelopathy.    COMPARISON:  MRI of the cervical spine from 06/21/2010    ACCESSION NUMBER(S):  [id number]    ORDERING CLINICIAN:  MALINDA STEELE    TECHNIQUE:  Sagittal T1, T2, STIR, axial T1 and axial T2 weighted images were  acquired through the cervical spine.    FINDINGS:  Alignment: The vertebral alignment is within normal limits.    Vertebrae/Intervertebral Discs: The vertebral bodies demonstrate  expected height.  A well-defined T1 hypointense and T2 hyperintense  lesion within the odontoid is minimally increased in size from the  2010 MRI, now measuring up to 1.4 cm (previously 1.0 cm). The marrow  signal is otherwise within normal limits. Disc desiccation throughout  the cervical spine.    Cord: Normal in caliber and signal.    C1-C2: The cervicomedullary junction appears unremarkable. There is  no central canal stenosis.    C2-C3: Posterior disc osteophyte complex slightly eccentric to the  left and mild bilateral facet arthropathy with mild effacement of the  ventral thecal sac. The bilateral neural foramen are normal. No  measurable spinal canal stenosis.    C3-C4: No posterior disc contour abnormality. There is moderate  bilateral facet joint arthropathy. Bilateral uncovertebral joint  spurs more prominent on the right. Constellation of findings causes  mild narrowing of the spinal canal with disc material and  uncovertebral joint spurring causing moderate left and severe right  neural foraminal narrowing.    C4-C5: Posterior disc osteophyte complex and bilateral facet  arthropathy with mild spinal canal narrowing and moderate bilateral  neural  foraminal narrowing, right more than left.    C5-C6: Posterior disc osteophyte complex, asymmetric to the left, and  moderate bilateral facet arthropathy. Causing effacement of the  ventral thecal sac and mild narrowing of the left lateral recess and  left neural foramen. No spinal canal or right neural foraminal  narrowing.    C6-C7: Posterior disc osteophyte complex mildly effaces the ventral  thecal sac without spinal canal stenosis or neural foraminal  narrowing.    C7-T1: There is a focal central disc herniation which mildly effaces  the ventral thecal sac. No spinal canal stenosis. There is mild  bilateral facet joint arthropathy. No neural foraminal narrowing.    T1-T2: Given sagittal views only there is no spinal canal stenosis or  neural foramen narrowing.    The prevertebral and posterior paraspinous soft tissues are within  normal limits.    Impression  1. Multilevel cervical spondylosis, with severe neural foramen  narrowing on the right at C3-C4 and multilevel moderate neural  foramen narrowing. Detailed description is given above. There is no  severe spinal canal stenosis.  2. Minimal interval increase in size of a well-defined osseous lesion  in the odontoid process from 2010, favoring a benign/nonaggressive  process.    I personally reviewed the images/study and I agree with the findings  as stated. This study was interpreted at [facility], [place], [place].      MR LUMBAR SPINE WO IV CONTRAST 03/12/2021    Narrative  Addendum Begins  MRN: [id number]  Patient Name: GIORGIO EDWARDS    ADDENDUM:  A 5 mm x 10 mm in disc fragment is present dorsal to the L3 vertebral  body in the lateral recess a on the right. Finding best appreciated  on parasagittal T2 weighted image 9/18.    Addendum Ends  MRN: [id number]  Patient Name: GIORGIO EDWARDS    STUDY:  MRI L-SPINE WO;  3/12/2021 6:04 pm    INDICATION:  bialteral svere leg and back pain failed steroids.    COMPARISON:  June  2016.    ACCESSION NUMBER(S):  [id number]    ORDERING CLINICIAN:  ROSINA GARCIA    TECHNIQUE:  The lumbar spine was studied in the sagital, axial and coronal planes  utiliing T1 and T2 weighted images.    FINDINGS:  The marrow signal and vertebral body height are normal. There is  narrowing of intervertebral disc spaces throughout the lumbar levels  the conus and sacrum are normal.  Images at each interspace reveal the following:  L1/L2  Mild bulging disc and facet hypertrophy without canal or foraminal  narrowing  L2/L3  Circumferential bulging intervertebral disc and bilateral facet  hypertrophy. Flattening of the thecal sac which measures  approximately 6 mm in AP dimension in the midline. Focal narrowing of  the left lateral recess.  L3/L4  Circumferential bulging intervertebral disc and marginal osteophyte  formation. No measurable canal stenosis. Bilateral foraminal  narrowing.  L4/L5  Bilateral facet hypertrophy and bulging intervertebral disc.  Flattening of the thecal sac which measures approximately 7 mm in AP  dimension in the midline. Severe bilateral foraminal narrowing worse  on the left than the right  L5/S1  Bilateral facet hypertrophy without canal or foraminal narrowing    Impression  * Lumbar spondylosis with canal and foraminal narrowing as described  above  *Findings have progressed slightly in the interval since the previous  exam    The examination was interpreted [facility]     No image results found.       1. Displacement of lumbar disc with radiculopathy  POCT INR manually resulted      2. Lumbar radiculopathy  [place] pain management TC      3. Coronary artery disease involving autologous artery coronary bypass graft without angina pectoris  POCT INR manually resulted           ASSESSMENT/PLAN  Shabbir Laurent is a 74 y.o. male presents for bilateral L3 transforaminal epidural steroid injection    Our plan is as follows:  - Follow In pain clinic  - Continue to participate in physical  therapy as well as physician directed home exercises  - Continue pain medications as prescribed       Adrian Freeman MD    no

## 2023-11-28 ENCOUNTER — OFFICE (OUTPATIENT)
Dept: URBAN - METROPOLITAN AREA CLINIC 38 | Facility: CLINIC | Age: 63
Setting detail: OPHTHALMOLOGY
End: 2023-11-28

## 2023-11-28 DIAGNOSIS — H90.3: ICD-10-CM

## 2023-11-28 PROCEDURE — 92593 HEARING AID CHECK; BINAURAL: CPT | Performed by: AUDIOLOGIST

## 2024-03-14 NOTE — OCCUPATIONAL THERAPY INITIAL EVALUATION ADULT - LEVEL OF INDEPENDENCE: TOILET, REHAB EVAL
Per pt msg today:  Yes I sent the request, yes taking it daily, it's the only thing that works until I can have surgery for removal of my band, and I have 3 left.  Last seen 1/16/24 and is pre-surgical so only seeing diet.  Plz advise re: refill.  Thx Cynthia Zapata, MS, RD, RN   assess

## 2024-05-28 ENCOUNTER — OFFICE (OUTPATIENT)
Dept: URBAN - METROPOLITAN AREA CLINIC 38 | Facility: CLINIC | Age: 64
Setting detail: OPHTHALMOLOGY
End: 2024-05-28

## 2024-05-28 DIAGNOSIS — H90.3: ICD-10-CM

## 2024-05-28 PROCEDURE — 92593 HEARING AID CHECK; BINAURAL: CPT | Performed by: AUDIOLOGIST

## 2024-08-13 ENCOUNTER — OFFICE (OUTPATIENT)
Dept: URBAN - METROPOLITAN AREA CLINIC 38 | Facility: CLINIC | Age: 64
Setting detail: OPHTHALMOLOGY
End: 2024-08-13
Payer: COMMERCIAL

## 2024-08-13 DIAGNOSIS — H90.3: ICD-10-CM

## 2024-08-13 DIAGNOSIS — H83.3X3: ICD-10-CM

## 2024-08-13 PROCEDURE — 92557 COMPREHENSIVE HEARING TEST: CPT | Performed by: AUDIOLOGIST

## 2024-08-13 PROCEDURE — 92567 TYMPANOMETRY: CPT | Performed by: AUDIOLOGIST

## 2024-08-23 ENCOUNTER — OFFICE (OUTPATIENT)
Dept: URBAN - METROPOLITAN AREA CLINIC 38 | Facility: CLINIC | Age: 64
Setting detail: OPHTHALMOLOGY
End: 2024-08-23
Payer: COMMERCIAL

## 2024-08-23 DIAGNOSIS — H43.813: ICD-10-CM

## 2024-08-23 DIAGNOSIS — H25.13: ICD-10-CM

## 2024-08-23 DIAGNOSIS — H52.4: ICD-10-CM

## 2024-08-23 PROCEDURE — 92134 CPTRZ OPH DX IMG PST SGM RTA: CPT

## 2024-08-23 PROCEDURE — 92083 EXTENDED VISUAL FIELD XM: CPT

## 2024-08-23 PROCEDURE — 92014 COMPRE OPH EXAM EST PT 1/>: CPT

## 2024-08-23 PROCEDURE — 92015 DETERMINE REFRACTIVE STATE: CPT

## 2024-08-23 ASSESSMENT — CONFRONTATIONAL VISUAL FIELD TEST (CVF)
OD_FINDINGS: FULL
OS_FINDINGS: FULL

## 2024-08-23 ASSESSMENT — LID EXAM ASSESSMENTS
OD_BLEPHARITIS: RUL T
OS_BLEPHARITIS: LUL T

## 2024-08-23 ASSESSMENT — LID POSITION - DERMATOCHALASIS
OD_DERMATOCHALASIS: 1+ 2+
OS_DERMATOCHALASIS: 1+ 2+

## 2025-02-18 ENCOUNTER — OFFICE (OUTPATIENT)
Dept: URBAN - METROPOLITAN AREA CLINIC 38 | Facility: CLINIC | Age: 65
Setting detail: OPHTHALMOLOGY
End: 2025-02-18

## 2025-02-18 DIAGNOSIS — H90.3: ICD-10-CM

## 2025-02-18 PROCEDURE — 92593 HEARING AID CHECK; BINAURAL: CPT | Performed by: AUDIOLOGIST

## 2025-08-19 ENCOUNTER — OFFICE (OUTPATIENT)
Dept: URBAN - METROPOLITAN AREA CLINIC 38 | Facility: CLINIC | Age: 65
Setting detail: OPHTHALMOLOGY
End: 2025-08-19
Payer: COMMERCIAL

## 2025-08-19 DIAGNOSIS — H83.3X3: ICD-10-CM

## 2025-08-19 DIAGNOSIS — H90.3: ICD-10-CM

## 2025-08-19 PROCEDURE — 92557 COMPREHENSIVE HEARING TEST: CPT | Performed by: AUDIOLOGIST

## 2025-08-19 PROCEDURE — 92550 TYMPANOMETRY & REFLEX THRESH: CPT | Performed by: AUDIOLOGIST
